# Patient Record
Sex: FEMALE | Race: BLACK OR AFRICAN AMERICAN | NOT HISPANIC OR LATINO | Employment: FULL TIME | URBAN - METROPOLITAN AREA
[De-identification: names, ages, dates, MRNs, and addresses within clinical notes are randomized per-mention and may not be internally consistent; named-entity substitution may affect disease eponyms.]

---

## 2021-08-04 ENCOUNTER — OFFICE VISIT (OUTPATIENT)
Dept: FAMILY MEDICINE | Facility: CLINIC | Age: 41
End: 2021-08-04
Payer: COMMERCIAL

## 2021-08-04 VITALS
OXYGEN SATURATION: 98 % | TEMPERATURE: 98 F | HEIGHT: 63 IN | BODY MASS INDEX: 51.91 KG/M2 | HEART RATE: 92 BPM | SYSTOLIC BLOOD PRESSURE: 140 MMHG | DIASTOLIC BLOOD PRESSURE: 90 MMHG | WEIGHT: 293 LBS

## 2021-08-04 DIAGNOSIS — Z12.31 SCREENING MAMMOGRAM, ENCOUNTER FOR: ICD-10-CM

## 2021-08-04 DIAGNOSIS — G89.29 CHRONIC PAIN OF LEFT KNEE: ICD-10-CM

## 2021-08-04 DIAGNOSIS — M25.562 CHRONIC PAIN OF LEFT KNEE: ICD-10-CM

## 2021-08-04 DIAGNOSIS — Z79.1 NSAID LONG-TERM USE: ICD-10-CM

## 2021-08-04 DIAGNOSIS — Z80.0 FAMILY HISTORY OF COLON CANCER IN MOTHER: ICD-10-CM

## 2021-08-04 DIAGNOSIS — Z13.220 ENCOUNTER FOR LIPID SCREENING FOR CARDIOVASCULAR DISEASE: ICD-10-CM

## 2021-08-04 DIAGNOSIS — I10 ESSENTIAL HYPERTENSION: Primary | ICD-10-CM

## 2021-08-04 DIAGNOSIS — Z13.6 ENCOUNTER FOR LIPID SCREENING FOR CARDIOVASCULAR DISEASE: ICD-10-CM

## 2021-08-04 PROBLEM — E66.9 OBESITY: Status: ACTIVE | Noted: 2017-04-21

## 2021-08-04 PROCEDURE — 3008F PR BODY MASS INDEX (BMI) DOCUMENTED: ICD-10-PCS | Mod: S$GLB,,, | Performed by: FAMILY MEDICINE

## 2021-08-04 PROCEDURE — 1160F PR REVIEW ALL MEDS BY PRESCRIBER/CLIN PHARMACIST DOCUMENTED: ICD-10-PCS | Mod: S$GLB,,, | Performed by: FAMILY MEDICINE

## 2021-08-04 PROCEDURE — 1159F MED LIST DOCD IN RCRD: CPT | Mod: S$GLB,,, | Performed by: FAMILY MEDICINE

## 2021-08-04 PROCEDURE — 3080F DIAST BP >= 90 MM HG: CPT | Mod: S$GLB,,, | Performed by: FAMILY MEDICINE

## 2021-08-04 PROCEDURE — 3077F PR MOST RECENT SYSTOLIC BLOOD PRESSURE >= 140 MM HG: ICD-10-PCS | Mod: S$GLB,,, | Performed by: FAMILY MEDICINE

## 2021-08-04 PROCEDURE — 3080F PR MOST RECENT DIASTOLIC BLOOD PRESSURE >= 90 MM HG: ICD-10-PCS | Mod: S$GLB,,, | Performed by: FAMILY MEDICINE

## 2021-08-04 PROCEDURE — 1159F PR MEDICATION LIST DOCUMENTED IN MEDICAL RECORD: ICD-10-PCS | Mod: S$GLB,,, | Performed by: FAMILY MEDICINE

## 2021-08-04 PROCEDURE — 3077F SYST BP >= 140 MM HG: CPT | Mod: S$GLB,,, | Performed by: FAMILY MEDICINE

## 2021-08-04 PROCEDURE — 99204 PR OFFICE/OUTPT VISIT, NEW, LEVL IV, 45-59 MIN: ICD-10-PCS | Mod: S$GLB,,, | Performed by: FAMILY MEDICINE

## 2021-08-04 PROCEDURE — 1126F PR PAIN SEVERITY QUANTIFIED, NO PAIN PRESENT: ICD-10-PCS | Mod: S$GLB,,, | Performed by: FAMILY MEDICINE

## 2021-08-04 PROCEDURE — 99204 OFFICE O/P NEW MOD 45 MIN: CPT | Mod: S$GLB,,, | Performed by: FAMILY MEDICINE

## 2021-08-04 PROCEDURE — 1126F AMNT PAIN NOTED NONE PRSNT: CPT | Mod: S$GLB,,, | Performed by: FAMILY MEDICINE

## 2021-08-04 PROCEDURE — 3008F BODY MASS INDEX DOCD: CPT | Mod: S$GLB,,, | Performed by: FAMILY MEDICINE

## 2021-08-04 PROCEDURE — 1160F RVW MEDS BY RX/DR IN RCRD: CPT | Mod: S$GLB,,, | Performed by: FAMILY MEDICINE

## 2021-08-04 RX ORDER — AMLODIPINE BESYLATE 10 MG/1
10 TABLET ORAL DAILY
Qty: 90 TABLET | Refills: 3 | Status: SHIPPED | OUTPATIENT
Start: 2021-08-04 | End: 2022-09-15 | Stop reason: SDUPTHER

## 2021-08-04 RX ORDER — IBUPROFEN 800 MG/1
800 TABLET ORAL 3 TIMES DAILY
COMMUNITY
Start: 2021-07-28 | End: 2021-08-04 | Stop reason: SDUPTHER

## 2021-08-04 RX ORDER — AMLODIPINE BESYLATE 10 MG/1
TABLET ORAL
COMMUNITY
End: 2021-08-04 | Stop reason: SDUPTHER

## 2021-08-04 RX ORDER — IBUPROFEN 800 MG/1
800 TABLET ORAL 3 TIMES DAILY PRN
Qty: 90 TABLET | Refills: 2 | Status: SHIPPED | OUTPATIENT
Start: 2021-08-04 | End: 2022-09-15 | Stop reason: SDUPTHER

## 2021-08-11 ENCOUNTER — CLINICAL SUPPORT (OUTPATIENT)
Dept: FAMILY MEDICINE | Facility: CLINIC | Age: 41
End: 2021-08-11
Payer: COMMERCIAL

## 2021-08-11 ENCOUNTER — LAB VISIT (OUTPATIENT)
Dept: LAB | Facility: CLINIC | Age: 41
End: 2021-08-11
Payer: COMMERCIAL

## 2021-08-11 VITALS — SYSTOLIC BLOOD PRESSURE: 130 MMHG | OXYGEN SATURATION: 98 % | HEART RATE: 85 BPM | DIASTOLIC BLOOD PRESSURE: 82 MMHG

## 2021-08-11 DIAGNOSIS — Z79.1 NSAID LONG-TERM USE: ICD-10-CM

## 2021-08-11 DIAGNOSIS — Z13.220 ENCOUNTER FOR LIPID SCREENING FOR CARDIOVASCULAR DISEASE: ICD-10-CM

## 2021-08-11 DIAGNOSIS — Z13.6 ENCOUNTER FOR LIPID SCREENING FOR CARDIOVASCULAR DISEASE: ICD-10-CM

## 2021-08-11 DIAGNOSIS — I10 ESSENTIAL HYPERTENSION: Primary | ICD-10-CM

## 2021-08-11 DIAGNOSIS — I10 ESSENTIAL HYPERTENSION: ICD-10-CM

## 2021-08-11 LAB
ALBUMIN SERPL BCP-MCNC: 3.3 G/DL (ref 3.5–5.2)
ALP SERPL-CCNC: 80 U/L (ref 55–135)
ALT SERPL W/O P-5'-P-CCNC: 9 U/L (ref 10–44)
ANION GAP SERPL CALC-SCNC: 10 MMOL/L (ref 8–16)
AST SERPL-CCNC: 14 U/L (ref 10–40)
BILIRUB SERPL-MCNC: 0.4 MG/DL (ref 0.1–1)
BUN SERPL-MCNC: 10 MG/DL (ref 6–20)
CALCIUM SERPL-MCNC: 8.9 MG/DL (ref 8.7–10.5)
CHLORIDE SERPL-SCNC: 105 MMOL/L (ref 95–110)
CHOLEST SERPL-MCNC: 131 MG/DL (ref 120–199)
CHOLEST/HDLC SERPL: 2.8 {RATIO} (ref 2–5)
CO2 SERPL-SCNC: 24 MMOL/L (ref 23–29)
CREAT SERPL-MCNC: 0.8 MG/DL (ref 0.5–1.4)
EST. GFR  (AFRICAN AMERICAN): >60 ML/MIN/1.73 M^2
EST. GFR  (NON AFRICAN AMERICAN): >60 ML/MIN/1.73 M^2
GLUCOSE SERPL-MCNC: 94 MG/DL (ref 70–110)
HDLC SERPL-MCNC: 46 MG/DL (ref 40–75)
HDLC SERPL: 35.1 % (ref 20–50)
LDLC SERPL CALC-MCNC: 72 MG/DL (ref 63–159)
NONHDLC SERPL-MCNC: 85 MG/DL
POTASSIUM SERPL-SCNC: 3.8 MMOL/L (ref 3.5–5.1)
PROT SERPL-MCNC: 7.4 G/DL (ref 6–8.4)
SODIUM SERPL-SCNC: 139 MMOL/L (ref 136–145)
TRIGL SERPL-MCNC: 65 MG/DL (ref 30–150)

## 2021-08-11 PROCEDURE — 80061 LIPID PANEL: CPT | Performed by: FAMILY MEDICINE

## 2021-08-11 PROCEDURE — 36415 COLL VENOUS BLD VENIPUNCTURE: CPT | Mod: ,,, | Performed by: FAMILY MEDICINE

## 2021-08-11 PROCEDURE — 80053 COMPREHEN METABOLIC PANEL: CPT | Performed by: FAMILY MEDICINE

## 2021-08-11 PROCEDURE — 36415 PR COLLECTION VENOUS BLOOD,VENIPUNCTURE: ICD-10-PCS | Mod: ,,, | Performed by: FAMILY MEDICINE

## 2022-01-05 ENCOUNTER — TELEPHONE (OUTPATIENT)
Dept: FAMILY MEDICINE | Facility: CLINIC | Age: 42
End: 2022-01-05
Payer: COMMERCIAL

## 2022-01-05 NOTE — TELEPHONE ENCOUNTER
----- Message from Vlad Calderon sent at 1/5/2022  4:12 PM CST -----  Type:  Sooner Apoointment Request    Caller is requesting a sooner appointment.  Caller declined first available appointment listed below.  Caller will not accept being placed on the waitlist and is requesting a message be sent to doctor.    Name of Caller:  pt  When is the first available appointment?  april  Symptoms:  sore throat/ loss voice   Best Call Back Number:  [phone]      Additional Information:  Please advise -- Thank you

## 2022-01-05 NOTE — TELEPHONE ENCOUNTER
Pt advised to report to [facility] urgent care. Pt verbalized understanding.

## 2022-01-06 ENCOUNTER — OFFICE VISIT (OUTPATIENT)
Dept: URGENT CARE | Facility: CLINIC | Age: 42
End: 2022-01-06
Payer: COMMERCIAL

## 2022-01-06 VITALS
SYSTOLIC BLOOD PRESSURE: 157 MMHG | TEMPERATURE: 98 F | DIASTOLIC BLOOD PRESSURE: 76 MMHG | OXYGEN SATURATION: 97 % | HEIGHT: 63 IN | WEIGHT: 293 LBS | RESPIRATION RATE: 20 BRPM | HEART RATE: 106 BPM | BODY MASS INDEX: 51.91 KG/M2

## 2022-01-06 DIAGNOSIS — U07.1 COVID-19: ICD-10-CM

## 2022-01-06 DIAGNOSIS — J02.9 SORE THROAT: Primary | ICD-10-CM

## 2022-01-06 LAB
CTP QC/QA: YES
CTP QC/QA: YES
S PYO RRNA THROAT QL PROBE: NEGATIVE
SARS-COV-2 AG RESP QL IA.RAPID: POSITIVE

## 2022-01-06 PROCEDURE — 99204 PR OFFICE/OUTPT VISIT, NEW, LEVL IV, 45-59 MIN: ICD-10-PCS | Mod: S$GLB,,, | Performed by: STUDENT IN AN ORGANIZED HEALTH CARE EDUCATION/TRAINING PROGRAM

## 2022-01-06 PROCEDURE — 87880 POCT RAPID STREP A: ICD-10-PCS | Mod: QW,,, | Performed by: STUDENT IN AN ORGANIZED HEALTH CARE EDUCATION/TRAINING PROGRAM

## 2022-01-06 PROCEDURE — 1159F PR MEDICATION LIST DOCUMENTED IN MEDICAL RECORD: ICD-10-PCS | Mod: S$GLB,,, | Performed by: STUDENT IN AN ORGANIZED HEALTH CARE EDUCATION/TRAINING PROGRAM

## 2022-01-06 PROCEDURE — 1160F RVW MEDS BY RX/DR IN RCRD: CPT | Mod: S$GLB,,, | Performed by: STUDENT IN AN ORGANIZED HEALTH CARE EDUCATION/TRAINING PROGRAM

## 2022-01-06 PROCEDURE — 3008F PR BODY MASS INDEX (BMI) DOCUMENTED: ICD-10-PCS | Mod: S$GLB,,, | Performed by: STUDENT IN AN ORGANIZED HEALTH CARE EDUCATION/TRAINING PROGRAM

## 2022-01-06 PROCEDURE — 3077F PR MOST RECENT SYSTOLIC BLOOD PRESSURE >= 140 MM HG: ICD-10-PCS | Mod: S$GLB,,, | Performed by: STUDENT IN AN ORGANIZED HEALTH CARE EDUCATION/TRAINING PROGRAM

## 2022-01-06 PROCEDURE — 87880 STREP A ASSAY W/OPTIC: CPT | Mod: QW,,, | Performed by: STUDENT IN AN ORGANIZED HEALTH CARE EDUCATION/TRAINING PROGRAM

## 2022-01-06 PROCEDURE — 1160F PR REVIEW ALL MEDS BY PRESCRIBER/CLIN PHARMACIST DOCUMENTED: ICD-10-PCS | Mod: S$GLB,,, | Performed by: STUDENT IN AN ORGANIZED HEALTH CARE EDUCATION/TRAINING PROGRAM

## 2022-01-06 PROCEDURE — 99204 OFFICE O/P NEW MOD 45 MIN: CPT | Mod: S$GLB,,, | Performed by: STUDENT IN AN ORGANIZED HEALTH CARE EDUCATION/TRAINING PROGRAM

## 2022-01-06 PROCEDURE — 1159F MED LIST DOCD IN RCRD: CPT | Mod: S$GLB,,, | Performed by: STUDENT IN AN ORGANIZED HEALTH CARE EDUCATION/TRAINING PROGRAM

## 2022-01-06 PROCEDURE — 87811 SARS-COV-2 COVID19 W/OPTIC: CPT | Mod: S$GLB,,, | Performed by: STUDENT IN AN ORGANIZED HEALTH CARE EDUCATION/TRAINING PROGRAM

## 2022-01-06 PROCEDURE — 3078F DIAST BP <80 MM HG: CPT | Mod: S$GLB,,, | Performed by: STUDENT IN AN ORGANIZED HEALTH CARE EDUCATION/TRAINING PROGRAM

## 2022-01-06 PROCEDURE — 3077F SYST BP >= 140 MM HG: CPT | Mod: S$GLB,,, | Performed by: STUDENT IN AN ORGANIZED HEALTH CARE EDUCATION/TRAINING PROGRAM

## 2022-01-06 PROCEDURE — 3008F BODY MASS INDEX DOCD: CPT | Mod: S$GLB,,, | Performed by: STUDENT IN AN ORGANIZED HEALTH CARE EDUCATION/TRAINING PROGRAM

## 2022-01-06 PROCEDURE — 87811 SARS CORONAVIRUS 2 ANTIGEN POCT, MANUAL READ: ICD-10-PCS | Mod: S$GLB,,, | Performed by: STUDENT IN AN ORGANIZED HEALTH CARE EDUCATION/TRAINING PROGRAM

## 2022-01-06 PROCEDURE — 3078F PR MOST RECENT DIASTOLIC BLOOD PRESSURE < 80 MM HG: ICD-10-PCS | Mod: S$GLB,,, | Performed by: STUDENT IN AN ORGANIZED HEALTH CARE EDUCATION/TRAINING PROGRAM

## 2022-01-06 RX ORDER — DEXTROMETHORPHAN HYDROBROMIDE, GUAIFENESIN, PHENYLEPHRINE HYDROCHLORIDE 17.5; 400; 1 MG/1; MG/1; MG/1
1 TABLET ORAL
Qty: 42 TABLET | Refills: 0 | Status: SHIPPED | OUTPATIENT
Start: 2022-01-06

## 2022-01-06 RX ORDER — FLUTICASONE PROPIONATE 50 MCG
1 SPRAY, SUSPENSION (ML) NASAL DAILY
Qty: 15.8 ML | Refills: 0 | Status: SHIPPED | OUTPATIENT
Start: 2022-01-06

## 2022-01-06 NOTE — PATIENT INSTRUCTIONS
Patient Education       COVID-19 Discharge Instructions   About this topic   Coronavirus disease 2019 is also known as COVID-19. It is a viral illness that infects the lungs. It is caused by a virus called SARS-associated coronavirus (SARS-CoV-2).  The signs of COVID-19 most often start a few days after you have been infected. In some people, it takes longer to show signs. Others never show signs of the infection. You may have a cough, fever, shaking chills and it may be hard to breathe. You may be very tired, have muscle aches, a headache or sore throat. Some people have an upset stomach or loose stools. Others lose their sense of smell or taste. You may not have these signs all the time and they may come and go while you are sick.  The virus spreads easily through droplets when you talk, sneeze, or cough. You can pass the virus to others when you are talking close together, singing, hugging, sharing food, or shaking hands. Doctors believe the germs also survive on surfaces like tables, door handles, and telephones. However, this is not a common way that COVID-19 spreads. Doctors believe you can also spread the infection even if you dont have any symptoms, but they do not know how that happens. This is why getting vaccinated is one of the best ways to keep you healthy and slow the spread of the virus.  Some people have a mild case of COVID-19 and are able to stay at home and away from others until they feel better. Others may need to be in the hospital if they are very sick. Some people with COVID-19 can have some symptoms for weeks or months. People with COVID-19 must isolate themselves. You can start to be around others when your doctor says it is safe to do so.       What care is needed at home?   · Ask your doctor what you need to do when you go home. Make sure you ask questions if you do not understand what the doctor says.  · Drink lots of water, juice, or broth to replace fluids lost from a fever.  · You  may use cool mist humidifiers to help ease congestion and coughing.  · Use 2 to 3 pillows to prop yourself up when you lie down to make it easier to breathe and sleep.  · Do not smoke and do not drink beer, wine, and mixed drinks (alcohol).  · To lower the chance of passing the infection to others, get a COVID-19 vaccine after your infection has resolved.  · If you have not been fully vaccinated:  ? Wear a mask over your mouth and nose if you are around others who are not sick. Cloth masks work best if they have more than one layer of fabric.  ? Wash your hands often.  ? Stay home in a separate room, if possible, away from others. Only go out to get medical care.  ? Use a separate bathroom if possible.  ? Do not make food for others.  What follow-up care is needed?   · Your doctor may ask you to make visits to the office to check on your progress. Be sure to keep these visits. Make sure you wear a mask at these visits.  · If you can, tell the staff you have COVID-19 ahead of time so they can take extra care to stop the disease from spreading.  · It may take a few weeks before your health returns to normal.  What drugs may be needed?   The doctor may order drugs to:  · Help with breathing  · Help with fever  · Help with swelling in your airways and lungs  · Control coughing  · Ease a sore throat  · Help a runny or stuffy nose  Will physical activity be limited?   You may have to limit your physical activity. Talk to your doctor about the right amount of activity for you. If you have been very sick with COVID-19, it can take some time to get your strength back.  Will there be any other care needed?   Doctors do not know how long you can pass the virus on to others after you are sick. This is why it is important to stay in a separate room, if possible, when you are sick. For now, doctors are giving general guidelines for you to follow after you have been sick. Before you go around other people, you should:  · Be fever  free for 24 hours without taking any drugs to lower the fever  · Have no symptoms of cough or shortness of breath  · Wait at least 10 days after first having symptoms or your first positive test, and you need to be symptom free as above. Some experts suggest waiting 20 days if you have had a more severe infection.  Talk with your doctor about getting a COVID-19 vaccine.  What problems could happen?   · Fluid loss. This is dehydration.  · Short-term or long-term lung damage  · Heart problems  · Death  When do I need to call the doctor?   · You are having so much trouble breathing that you can only say one or two words at a time.  · You need to sit upright at all times to be able to breathe and/or cannot lie down.  · You are very confused or cannot stay awake.  · Your lips or skin start to turn blue or grey.  · You think you might be having a medical emergency. Some examples of medical emergencies are:  ? Severe chest pain.  ? Not able to speak or move normally.  · You have trouble breathing when talking or sitting still.  · You have new shortness of breath.  · You become weak or dizzy.  · You have very dark urine or do not pass urine for more than 8 hours.  · You have new or worsening COVID-19 symptoms like:  ? Fever  ? Cough  ? Feeling very tired  ? Shaking chills  ? Headache  ? Trouble swallowing  ? Throwing up  ? Loose stools  ? Reddish purple spots on your fingers or toes  Teach Back: Helping You Understand   The Teach Back Method helps you understand the information we are giving you. After you talk with the staff, tell them in your own words what you learned. This helps to make sure the staff has described each thing clearly. It also helps to explain things that may have been confusing. Before going home, make sure you can do these:  · I can tell you about my condition.  · I can tell you what may help ease my breathing.  · I can tell you what I can do to help avoid passing the infection to others.  · I can tell  you what I will do if I have trouble breathing; feel sleepy or confused; or my fingertips, fingernails, skin, or lips are blue.  Where can I learn more?   Centers for Disease Control and Prevention  https://www.cdc.gov/coronavirus/2019-ncov/about/index.html   Centers for Disease Control and Prevention  https://www.cdc.gov/coronavirus/2019-ncov/hcp/disposition-in-home-patients.html   World Health Organization  https://www.who.int/news-room/q-a-detail/d-h-tfzxyfgfbxqtj   Last Reviewed Date   2021-10-05  Consumer Information Use and Disclaimer   This information is not specific medical advice and does not replace information you receive from your health care provider. This is only a brief summary of general information. It does NOT include all information about conditions, illnesses, injuries, tests, procedures, treatments, therapies, discharge instructions or life-style choices that may apply to you. You must talk with your health care provider for complete information about your health and treatment options. This information should not be used to decide whether or not to accept your health care providers advice, instructions or recommendations. Only your health care provider has the knowledge and training to provide advice that is right for you.  Copyright   Copyright © 2021 [employer] and its affiliates and/or licensors. All rights reserved.

## 2022-01-06 NOTE — PROGRESS NOTES
"Subjective:       Patient ID: Yadira Thornton is a 41 y.o. female.    Vitals:  height is 5' 3" (1.6 m) and weight is 185.1 kg (408 lb) (abnormal). Her temperature is 97.8 °F (36.6 °C). Her blood pressure is 157/76 (abnormal) and her pulse is 106. Her respiration is 20 and oxygen saturation is 97%.     Chief Complaint: Sinus Problem    Patient is a 41-year-old female with history of hypertension who presents to clinic for evaluation of sinus issues.  Patient reports symptoms times 3-4 days.  Patient denies any recent or known sick exposure.  Patient denies any over-the-counter medications for symptoms at this point.  Patient complaining of nasal sinus congestion with clear mucus, postnasal drainage, sore throat with voice change however no painful swallowing, and ear fullness.  Patient denies any acute loss of smell or taste, headaches or dizziness, fever or chills, generalized body aches, chest pain or palpitations, shortness of breath, cough, abdominal pain, nausea or vomiting, or diarrhea.  Patient reports it is not vaccinated for COVID at this time.    Sinus Problem  This is a new problem. The current episode started in the past 7 days. The problem has been gradually worsening since onset. There has been no fever. Associated symptoms include congestion (Clear mucus), ear pain (Ear fullness), a hoarse voice and a sore throat. Pertinent negatives include no chills, coughing, diaphoresis, headaches or shortness of breath.       Constitution: Negative for chills, sweating, fatigue and fever.   HENT: Positive for ear pain (Ear fullness), congestion (Clear mucus), postnasal drip, sore throat and voice change. Negative for trouble swallowing.    Neck: neck negative.   Cardiovascular: Negative.  Negative for chest pain and palpitations.   Eyes: Negative.    Respiratory: Negative for chest tightness, cough and shortness of breath.    Gastrointestinal: Negative.  Negative for abdominal pain, nausea, vomiting and diarrhea. "   Endocrine: negative.   Genitourinary: Negative.    Musculoskeletal: Negative.  Negative for muscle ache.   Skin: Negative.  Negative for color change, pale, rash and erythema.   Allergic/Immunologic: Negative.    Neurological: Negative.  Negative for dizziness, passing out, facial drooping, headaches, disorientation and altered mental status.   Hematologic/Lymphatic: Negative.    Psychiatric/Behavioral: Negative.  Negative for altered mental status, disorientation and confusion.       Objective:      Physical Exam   Constitutional: She is oriented to person, place, and time. She appears well-developed and well-nourished. She is cooperative.  Non-toxic appearance. She does not appear ill. No distress. obesity  HENT:   Head: Normocephalic and atraumatic.   Ears:   Right Ear: Hearing, tympanic membrane, external ear and ear canal normal.   Left Ear: Hearing, tympanic membrane, external ear and ear canal normal.   Nose: Congestion present. No mucosal edema, rhinorrhea or nasal deformity. No epistaxis. Right sinus exhibits no maxillary sinus tenderness and no frontal sinus tenderness. Left sinus exhibits no maxillary sinus tenderness and no frontal sinus tenderness.   Mouth/Throat: Uvula is midline and mucous membranes are normal. Mucous membranes are moist. No trismus in the jaw. Normal dentition. No uvula swelling. Posterior oropharyngeal erythema present. No oropharyngeal exudate. Tonsils are 1+ on the right. Oropharynx is clear.   Eyes: Conjunctivae and lids are normal. Pupils are equal, round, and reactive to light. Right eye exhibits no discharge. Left eye exhibits no discharge. No scleral icterus.   Neck: Trachea normal and phonation normal. Neck supple. No neck rigidity present.   Cardiovascular: Regular rhythm, normal heart sounds, intact distal pulses and normal pulses. Tachycardia present.   Pulmonary/Chest: Effort normal and breath sounds normal. No respiratory distress (Able to speak in full complete  sentences.  Equal rise and fall of chest.). She has no wheezes. She has no rhonchi. She has no rales.   Abdominal: Normal appearance and bowel sounds are normal. She exhibits no distension. Soft. There is no abdominal tenderness.   Musculoskeletal: Normal range of motion.         General: No deformity or edema. Normal range of motion.      Cervical back: She exhibits no tenderness.   Lymphadenopathy:     She has no cervical adenopathy.   Neurological: She is alert and oriented to person, place, and time. She exhibits normal muscle tone. Coordination normal.   Skin: Skin is warm, dry, intact, not diaphoretic, not pale and no rash. Capillary refill takes 2 to 3 seconds. No erythema   Psychiatric: She has a normal mood and affect. Her speech is normal and behavior is normal. Judgment and thought content normal.   Nursing note and vitals reviewed.        Assessment:       1. Sore throat    2. COVID-19          Plan:         Sore throat  -     SARS Coronavirus 2 Antigen, POCT Manual Read  -     POCT rapid strep A    COVID-19    Other orders  -     phenylephrine-DM-guaiFENesin (DECONEX DMX) 10-17.5-400 mg Tab; Take 1 tablet by mouth every 4 to 6 hours as needed (Congestion).  Dispense: 42 tablet; Refill: 0  -     fluticasone propionate (FLONASE) 50 mcg/actuation nasal spray; 1 spray (50 mcg total) by Each Nostril route once daily.  Dispense: 15.8 mL; Refill: 0                 Labs:  Rapid strep negative.  COVID positive.  Quarantine as recommended.  COVID recommendations provided.  Take medications as prescribed.  Patient refused need for monoclonal antibody infusion.  Follow-up with PCP in 1 day.  Return to clinic as needed.  To ED for any new or acutely worsening symptoms.  Patient in agreement with plan of care.

## 2022-01-18 ENCOUNTER — TELEPHONE (OUTPATIENT)
Dept: FAMILY MEDICINE | Facility: CLINIC | Age: 42
End: 2022-01-18
Payer: COMMERCIAL

## 2022-06-14 ENCOUNTER — TELEPHONE (OUTPATIENT)
Dept: FAMILY MEDICINE | Facility: CLINIC | Age: 42
End: 2022-06-14
Payer: COMMERCIAL

## 2022-08-24 DIAGNOSIS — I10 ESSENTIAL HYPERTENSION: ICD-10-CM

## 2022-08-31 DIAGNOSIS — Z11.59 NEED FOR HEPATITIS C SCREENING TEST: ICD-10-CM

## 2022-09-15 ENCOUNTER — OFFICE VISIT (OUTPATIENT)
Dept: FAMILY MEDICINE | Facility: CLINIC | Age: 42
End: 2022-09-15
Payer: COMMERCIAL

## 2022-09-15 VITALS
WEIGHT: 293 LBS | HEART RATE: 96 BPM | OXYGEN SATURATION: 99 % | BODY MASS INDEX: 51.91 KG/M2 | DIASTOLIC BLOOD PRESSURE: 84 MMHG | SYSTOLIC BLOOD PRESSURE: 134 MMHG | RESPIRATION RATE: 18 BRPM | HEIGHT: 63 IN

## 2022-09-15 DIAGNOSIS — G89.29 CHRONIC PAIN OF LEFT KNEE: ICD-10-CM

## 2022-09-15 DIAGNOSIS — R20.0 LEFT LEG NUMBNESS: ICD-10-CM

## 2022-09-15 DIAGNOSIS — M25.562 CHRONIC PAIN OF LEFT KNEE: ICD-10-CM

## 2022-09-15 DIAGNOSIS — I10 ESSENTIAL HYPERTENSION: ICD-10-CM

## 2022-09-15 PROCEDURE — 3079F DIAST BP 80-89 MM HG: CPT | Mod: S$GLB,,, | Performed by: FAMILY MEDICINE

## 2022-09-15 PROCEDURE — 1160F PR REVIEW ALL MEDS BY PRESCRIBER/CLIN PHARMACIST DOCUMENTED: ICD-10-PCS | Mod: S$GLB,,, | Performed by: FAMILY MEDICINE

## 2022-09-15 PROCEDURE — 1159F PR MEDICATION LIST DOCUMENTED IN MEDICAL RECORD: ICD-10-PCS | Mod: S$GLB,,, | Performed by: FAMILY MEDICINE

## 2022-09-15 PROCEDURE — 99214 PR OFFICE/OUTPT VISIT, EST, LEVL IV, 30-39 MIN: ICD-10-PCS | Mod: S$GLB,,, | Performed by: FAMILY MEDICINE

## 2022-09-15 PROCEDURE — 3075F SYST BP GE 130 - 139MM HG: CPT | Mod: S$GLB,,, | Performed by: FAMILY MEDICINE

## 2022-09-15 PROCEDURE — 1159F MED LIST DOCD IN RCRD: CPT | Mod: S$GLB,,, | Performed by: FAMILY MEDICINE

## 2022-09-15 PROCEDURE — 3008F PR BODY MASS INDEX (BMI) DOCUMENTED: ICD-10-PCS | Mod: S$GLB,,, | Performed by: FAMILY MEDICINE

## 2022-09-15 PROCEDURE — 99999 PR PBB SHADOW E&M-EST. PATIENT-LVL IV: ICD-10-PCS | Mod: PBBFAC,,, | Performed by: FAMILY MEDICINE

## 2022-09-15 PROCEDURE — 3075F PR MOST RECENT SYSTOLIC BLOOD PRESS GE 130-139MM HG: ICD-10-PCS | Mod: S$GLB,,, | Performed by: FAMILY MEDICINE

## 2022-09-15 PROCEDURE — 3008F BODY MASS INDEX DOCD: CPT | Mod: S$GLB,,, | Performed by: FAMILY MEDICINE

## 2022-09-15 PROCEDURE — 99999 PR PBB SHADOW E&M-EST. PATIENT-LVL IV: CPT | Mod: PBBFAC,,, | Performed by: FAMILY MEDICINE

## 2022-09-15 PROCEDURE — 99214 OFFICE O/P EST MOD 30 MIN: CPT | Mod: S$GLB,,, | Performed by: FAMILY MEDICINE

## 2022-09-15 PROCEDURE — 3079F PR MOST RECENT DIASTOLIC BLOOD PRESSURE 80-89 MM HG: ICD-10-PCS | Mod: S$GLB,,, | Performed by: FAMILY MEDICINE

## 2022-09-15 PROCEDURE — 1160F RVW MEDS BY RX/DR IN RCRD: CPT | Mod: S$GLB,,, | Performed by: FAMILY MEDICINE

## 2022-09-15 RX ORDER — AMLODIPINE BESYLATE 10 MG/1
10 TABLET ORAL DAILY
Qty: 90 TABLET | Refills: 3 | Status: SHIPPED | OUTPATIENT
Start: 2022-09-15

## 2022-09-15 RX ORDER — IBUPROFEN 800 MG/1
800 TABLET ORAL 3 TIMES DAILY PRN
Qty: 90 TABLET | Refills: 3 | Status: SHIPPED | OUTPATIENT
Start: 2022-09-15

## 2022-09-15 NOTE — PROGRESS NOTES
Subjective:       Patient ID: Yadira Thornton is a 42 y.o. female.    Chief Complaint: Medication Refill and leg numbness    This patient is new to me.  Yadira Thornton presents to the clinic today for medication refills and complaints of leg numbness. Patient states the leg numbness comes and goes. Patient states she will be standing at work and it'll start. Patient states it feels like it makes her leg stick to her clothes and then it resolves.   Patient educated on plan of care, verbalized understanding.       Review of Systems   Constitutional:  Negative for activity change, appetite change, chills, diaphoresis and fever.   HENT:  Negative for congestion, ear pain, postnasal drip, sinus pressure, sneezing and sore throat.    Eyes:  Negative for pain, discharge, redness and itching.   Respiratory:  Negative for apnea, cough, chest tightness, shortness of breath and wheezing.    Cardiovascular:  Negative for chest pain and leg swelling.   Gastrointestinal:  Negative for abdominal distention, abdominal pain, constipation, diarrhea, nausea and vomiting.   Genitourinary:  Negative for difficulty urinating, dysuria, flank pain and frequency.   Musculoskeletal:  Positive for myalgias.   Skin:  Negative for color change, rash and wound.   Neurological:  Negative for dizziness.     Patient Active Problem List   Diagnosis    Trigeminal neuralgia    Essential hypertension    Obesity    Chronic pain of left knee       Objective:      Physical Exam  Vitals reviewed.   Constitutional:       General: She is not in acute distress.     Appearance: Normal appearance. She is well-developed. She is obese.   HENT:      Head: Normocephalic.      Nose: Nose normal.   Eyes:      Conjunctiva/sclera: Conjunctivae normal.      Pupils: Pupils are equal, round, and reactive to light.   Cardiovascular:      Rate and Rhythm: Normal rate and regular rhythm.      Heart sounds: Normal heart sounds.   Pulmonary:      Effort: Pulmonary effort is normal.  "No respiratory distress.      Breath sounds: Normal breath sounds.   Musculoskeletal:      Cervical back: Normal range of motion and neck supple.   Skin:     General: Skin is warm and dry.      Findings: No rash.   Neurological:      Mental Status: She is alert and oriented to person, place, and time.   Psychiatric:         Mood and Affect: Mood normal.         Behavior: Behavior normal.       Lab Results   Component Value Date    CHOL 131 08/11/2021    TRIG 65 08/11/2021    HDL 46 08/11/2021    ALT 9 (L) 08/11/2021    AST 14 08/11/2021     08/11/2021    K 3.8 08/11/2021     08/11/2021    CREATININE 0.8 08/11/2021    BUN 10 08/11/2021    CO2 24 08/11/2021     The 10-year ASCVD risk score (Meg CRAMER, et al., 2019) is: 2.5%    Values used to calculate the score:      Age: 42 years      Sex: Female      Is Non- : Yes      Diabetic: No      Tobacco smoker: No      Systolic Blood Pressure: 134 mmHg      Is BP treated: Yes      HDL Cholesterol: 46 mg/dL      Total Cholesterol: 131 mg/dL  Visit Vitals  /84 (BP Location: Right arm, Patient Position: Sitting, BP Method: Large (Manual))   Pulse 96   Resp 18   Ht 5' 3" (1.6 m)   Wt (!) 187.4 kg (413 lb 2.3 oz)   LMP 09/10/2022 (Exact Date)   SpO2 99%   BMI 73.18 kg/m²      Assessment:       1. Body mass index (BMI) 70 or greater, adult    2. Chronic pain of left knee    3. Essential hypertension    4. Left leg numbness        Plan:       Yadira was seen today for medication refill and leg numbness.    Diagnoses and all orders for this visit:    Body mass index (BMI) 70 or greater, adult  Body mass index is 73.18 kg/m².  Continue healthy diet and regular exercise as tolerated.  Continue medications as prescribed.  Follow up with PCP     Chronic pain of left knee  -     ibuprofen (ADVIL,MOTRIN) 800 MG tablet; Take 1 tablet (800 mg total) by mouth 3 (three) times daily as needed for Pain.  Stable    Essential hypertension  -     amLODIPine " (NORVASC) 10 MG tablet; Take 1 tablet (10 mg total) by mouth once daily.  Stable  Continue medications as prescribed.  Follow up with PCP     Left leg numbness  -     US Lower Extremity Veins Left; Future  Discussed stretches for pinched nerve  Discussed possible follow up with physical medicine and rehab  Continue medications as prescribed.  Follow up with PCP      Follow up if symptoms worsen or fail to improve.      Future Appointments       Date Specialty Appt Notes    9/19/2022 Lab     9/20/2022 Radiology Left leg numbness [R20.0]

## 2022-09-19 ENCOUNTER — LAB VISIT (OUTPATIENT)
Dept: LAB | Facility: CLINIC | Age: 42
End: 2022-09-19
Payer: COMMERCIAL

## 2022-09-19 ENCOUNTER — TELEPHONE (OUTPATIENT)
Dept: FAMILY MEDICINE | Facility: CLINIC | Age: 42
End: 2022-09-19
Payer: COMMERCIAL

## 2022-09-19 DIAGNOSIS — Z11.59 NEED FOR HEPATITIS C SCREENING TEST: ICD-10-CM

## 2022-09-19 DIAGNOSIS — I10 ESSENTIAL HYPERTENSION: ICD-10-CM

## 2022-09-19 LAB
ALBUMIN SERPL BCP-MCNC: 3.6 G/DL (ref 3.5–5.2)
ALP SERPL-CCNC: 83 U/L (ref 55–135)
ALT SERPL W/O P-5'-P-CCNC: 16 U/L (ref 10–44)
ANION GAP SERPL CALC-SCNC: 10 MMOL/L (ref 8–16)
AST SERPL-CCNC: 18 U/L (ref 10–40)
BILIRUB SERPL-MCNC: 0.3 MG/DL (ref 0.1–1)
BUN SERPL-MCNC: 14 MG/DL (ref 6–20)
CALCIUM SERPL-MCNC: 9 MG/DL (ref 8.7–10.5)
CHLORIDE SERPL-SCNC: 104 MMOL/L (ref 95–110)
CO2 SERPL-SCNC: 25 MMOL/L (ref 23–29)
CREAT SERPL-MCNC: 0.9 MG/DL (ref 0.5–1.4)
EST. GFR  (NO RACE VARIABLE): >60 ML/MIN/1.73 M^2
GLUCOSE SERPL-MCNC: 92 MG/DL (ref 70–110)
POTASSIUM SERPL-SCNC: 3.7 MMOL/L (ref 3.5–5.1)
PROT SERPL-MCNC: 7.7 G/DL (ref 6–8.4)
SODIUM SERPL-SCNC: 139 MMOL/L (ref 136–145)

## 2022-09-19 PROCEDURE — 80053 COMPREHEN METABOLIC PANEL: CPT | Performed by: FAMILY MEDICINE

## 2022-09-19 PROCEDURE — 86803 HEPATITIS C AB TEST: CPT | Performed by: FAMILY MEDICINE

## 2022-09-19 NOTE — TELEPHONE ENCOUNTER
----- Message from Rosa Solares MD sent at 9/19/2022  1:27 PM CDT -----  Great news. Labs have normalized and this is wonderful. Normal result

## 2022-09-20 ENCOUNTER — HOSPITAL ENCOUNTER (OUTPATIENT)
Dept: RADIOLOGY | Facility: HOSPITAL | Age: 42
Discharge: HOME | End: 2022-09-20
Attending: FAMILY MEDICINE
Payer: COMMERCIAL

## 2022-09-20 DIAGNOSIS — R20.0 LEFT LEG NUMBNESS: ICD-10-CM

## 2022-09-20 LAB — HCV AB SERPL QL IA: NORMAL

## 2022-09-20 PROCEDURE — 93971 EXTREMITY STUDY: CPT | Mod: 26,LT,, | Performed by: RADIOLOGY

## 2022-09-20 PROCEDURE — 93971 US LOWER EXTREMITY VEINS LEFT: ICD-10-PCS | Mod: 26,LT,, | Performed by: RADIOLOGY

## 2022-09-20 PROCEDURE — 93971 EXTREMITY STUDY: CPT | Mod: TC,LT

## 2023-03-21 ENCOUNTER — PATIENT OUTREACH (OUTPATIENT)
Dept: ADMINISTRATIVE | Facility: HOSPITAL | Age: 43
End: 2023-03-21
Payer: COMMERCIAL

## 2023-03-21 NOTE — LETTER
March 21, 2023    Yadira Thornton  [address]  [place] [address]             [facility]  [address] [address]  Phone: [phone] Dear Cora Hinton, [facility] is committed to your overall health.  To help you get the most out of each of your visits, we will review your information to make sure you are up to date on all of your recommended tests and/or procedures.      Mimi Prescott MD has found that your chart shows you may be due for a PAP SMEAR & MAMMOGRAM.     A pap smear should be completed at least every 3 years and a mammogram should be completed at least every 2 years.      If you have had any of the above done at another facility, please let us know so that we may obtain copies from that facility. Your [facility] chart can not be updated without the record.    Otherwise, please schedule these appointments at your earliest convenience by calling [phone] or going to [email or web address].    If you are no longer using [facility] for you Family Medicine needs please let us know so we can update the care team in [facility].    Thank you for letting [facility] take care of your healthcare needs.    Ca Salter LPN  Performance Improvement Coordinator  [facility]  [address] [place] [address] [phone]

## 2023-03-21 NOTE — PROGRESS NOTES
Population Health chart review. Working a non-compliant Cervical Cancer Screening report. Attempted to reach pt by phone, no answer, no VM, sending portal/letter outreach at this time. Also sending notification about PCP changes.

## 2023-03-21 NOTE — LETTER
March 21, 2023    Yadira Thornton  [address]  [place] [address]             [facility]  [address] [address]  Phone: [phone] Yadira Thornton     [facility] provider, Mimi Prescott MD is no longer practicing at [facility]. She has moved to our [facility] in [place], [place]. We are thankful for the excellent care she has provided to her patients and our community.     To make the transition to a new provider as seamless as possible, we encourage you to continue your care with [facility]. To ensure the continuity of your care, Mimi Prescott MD patients can be seamlessly transitioned to one of several Family Medicine providers within our group practice. If you would like to continue care with Mimi Prescott MD at [facility], please call [phone] to schedule appointment.     Listed below are providers we would like you to consider as you resume your care with [facility]. Information about the unique qualifications and special areas of interest held by our physicians can be found at [email or web address].                 Providers:                                          Location:                                                     Brittny Oconnor MD          [facility]     SCOTTY Koroma NP Karen McDay, NP                                                                                                          We are confident that you will continue to receive the excellent treatment and service to which you are accustomed. As always, your medical records are available electronically to any of our providers, ensuring no disruption in your care.  If you need an appointment, please call [phone] to schedule with the team.  You can also schedule appointments online at [email or web address].     We also have Family Medicine clincs in:     [place], [place]  [place], [place]  [place], [place]  [place]     We value the trust you have placed in [facility] in allowing us the privilege of caring for you and your familys medical needs.       Sincerely,     [facility]

## 2023-05-24 ENCOUNTER — HOSPITAL ENCOUNTER (EMERGENCY)
Facility: HOSPITAL | Age: 43
Discharge: HOME | End: 2023-05-24
Attending: EMERGENCY MEDICINE
Payer: COMMERCIAL

## 2023-05-24 VITALS
DIASTOLIC BLOOD PRESSURE: 78 MMHG | TEMPERATURE: 99 F | BODY MASS INDEX: 51.91 KG/M2 | SYSTOLIC BLOOD PRESSURE: 145 MMHG | HEIGHT: 63 IN | OXYGEN SATURATION: 100 % | HEART RATE: 88 BPM | WEIGHT: 293 LBS | RESPIRATION RATE: 17 BRPM

## 2023-05-24 DIAGNOSIS — G89.29 CHRONIC PAIN OF RIGHT KNEE: Primary | ICD-10-CM

## 2023-05-24 DIAGNOSIS — M25.561 CHRONIC PAIN OF RIGHT KNEE: Primary | ICD-10-CM

## 2023-05-24 DIAGNOSIS — R52 PAIN: ICD-10-CM

## 2023-05-24 PROCEDURE — 25000003 PHARM REV CODE 250: Performed by: NURSE PRACTITIONER

## 2023-05-24 PROCEDURE — 99283 EMERGENCY DEPT VISIT LOW MDM: CPT

## 2023-05-24 RX ORDER — OXYCODONE AND ACETAMINOPHEN 5; 325 MG/1; MG/1
1 TABLET ORAL EVERY 6 HOURS PRN
Qty: 12 TABLET | Refills: 0 | Status: SHIPPED | OUTPATIENT
Start: 2023-05-24

## 2023-05-24 RX ORDER — OXYCODONE AND ACETAMINOPHEN 5; 325 MG/1; MG/1
1 TABLET ORAL
Status: COMPLETED | OUTPATIENT
Start: 2023-05-24 | End: 2023-05-24

## 2023-05-24 RX ADMIN — OXYCODONE AND ACETAMINOPHEN 1 TABLET: 325; 5 TABLET ORAL at 10:05

## 2023-05-24 NOTE — Clinical Note
"Yadira Lazosmooth Thornton was seen and treated in our emergency department on 5/24/2023.  She may return to work on 05/29/2023.       If you have any questions or concerns, please don't hesitate to call.      Lupe Alcantara NP"

## 2023-05-24 NOTE — DISCHARGE INSTRUCTIONS
Make a follow-up appoint with Dr. Kumar or an orthopedist of your choice.  Return to the ED for any worsening of symptoms or any other concerns.  Take your pain medication as instructed.  You may not drive on this pain medication

## 2023-05-24 NOTE — ED PROVIDER NOTES
Encounter Date: 2023       History     Chief Complaint   Patient presents with    Leg Pain     Pt. States that she started to have right leg/knee pain three weeks ago that progressively gotten worse.      Presents with complaint of right knee pain.  Onset 2 weeks ago.  This is chronic pain.  Patient reports it is a gradual onset.  Patient has seen an Ortho in [place] in the past.  Denies taking any medication for the pain not even OTCs.  Patient has a job where she stands on feet for long hours.  Denies injury.  Patient is morbidly obese.    Review of patient's allergies indicates:   Allergen Reactions    Sulfa (sulfonamide antibiotics) Swelling    Carbamazepine Rash    Hydrochlorothiazide Rash    Lisinopril Rash     Past Medical History:   Diagnosis Date    Chronic knee pain     Hypertension     Obesity      Past Surgical History:   Procedure Laterality Date     SECTION      x 2     Family History   Problem Relation Age of Onset    Cancer Mother     Hypertension Maternal Grandmother     Diabetes Maternal Grandmother      Social History     Tobacco Use    Smoking status: Never    Smokeless tobacco: Never   Substance Use Topics    Alcohol use: Not Currently    Drug use: Never     Review of Systems   Constitutional:  Negative for fever.   Respiratory:  Negative for cough, shortness of breath and wheezing.    Cardiovascular:  Negative for chest pain, palpitations and leg swelling.   Gastrointestinal:  Negative for abdominal pain, diarrhea, nausea and vomiting.   Genitourinary:  Negative for dysuria.   Musculoskeletal:  Negative for back pain.        Right knee pain   Skin:  Negative for rash.   Neurological:  Negative for weakness.     Physical Exam     Initial Vitals [23 0956]   BP Pulse Resp Temp SpO2   (!) 186/78 97 20 98.8 °F (37.1 °C) 99 %      MAP       --         Physical Exam    Constitutional: She appears well-developed and well-nourished.   HENT:   Head: Normocephalic.   Mouth/Throat:  Oropharynx is clear and moist.   Eyes: Conjunctivae are normal.   Neck: Neck supple.   Normal range of motion.  Cardiovascular:  Normal rate, regular rhythm and normal heart sounds.           Pulmonary/Chest: Breath sounds normal. No respiratory distress.   Musculoskeletal:         General: Normal range of motion.      Cervical back: Normal range of motion and neck supple.      Comments: Patient is able to bend and extend her knee without difficulty.  She does report this increases her pain.  Patient is morbidly obese.  It is difficult to assess swelling.     Neurological: She is alert and oriented to person, place, and time. No sensory deficit. GCS score is 15. GCS eye subscore is 4. GCS verbal subscore is 5. GCS motor subscore is 6.   Skin: Skin is warm and dry.   Psychiatric: She has a normal mood and affect. Thought content normal.       ED Course   Procedures  Labs Reviewed - No data to display         Imaging Results              X-Ray Knee Complete 4 or more Views Right (Final result)  Result time 05/24/23 10:42:08   Procedure changed from X-Ray Knee 3 View Right     Final result by John Vasquez Jr., MD (05/24/23 10:42:08)                   Narrative:    HISTORY: pain    COMPARISON:None available.    FINDINGS:Osseous structures appear intact without evidence of an acute fracture deformity. Chronic degenerative arthrosis of the medial joint line narrowing of the medial trochanter cartilage space, sparing the lateral tibiofemoral and patellofemoral joint spaces. No significant joint effusion. Soft tissues are within normal limits.    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    Electronically signed by:  John Vasquez MD  5/24/2023 10:42 AM CDT Workstation: [id number]                                     Medications   oxyCODONE-acetaminophen 5-325 mg per tablet 1 tablet (1 tablet Oral  Given 5/24/23 1022)     Medical Decision Making:   Initial Assessment:   Presents with right knee pain.  Onset 2 weeks.  Patient reports gradual onset worsening.  Denies injury  Clinical Tests:   Radiological Study: Reviewed  ED Management:  Patient was given Percocet 5 mg for the pain with good results.  She has a  at bedside.  Her x-ray reports chronic degenerative arthrosis.  As stated before patient is morbidly obese.  We have had a discussion about the possibility of her having a gastric sleeve for weight loss.  She was given a work excuse until Monday.  She was also given oxycodone 5 mg for home use.  At no time while in the ED did she appear to be in any acute distress.  She was given return precautions.  She was also given the name of an orthopedist to follow up with.  Have discussed this patient with Dr. Willard                        Clinical Impression:   Final diagnoses:  [R52] Pain  [M25.561, G89.29] Chronic pain of right knee (Primary)        ED Disposition Condition    Discharge Stable          ED Prescriptions       Medication Sig Dispense Start Date End Date Auth. Provider    oxyCODONE-acetaminophen (PERCOCET) 5-325 mg per tablet Take 1 tablet by mouth every 6 (six) hours as needed for Pain. 12 tablet 5/24/2023 -- Lupe Alcantara NP          Follow-up Information       Follow up With Specialties Details Why Contact Info    Santana Kumar II, MD Orthopedic Surgery In 2 days  [address] [place] [address] [address] Jace RODRIGUEZ [id number]  [phone]               Lupe Alcantara NP  05/24/23 1208

## 2023-07-17 ENCOUNTER — PATIENT OUTREACH (OUTPATIENT)
Dept: ADMINISTRATIVE | Facility: HOSPITAL | Age: 43
End: 2023-07-17
Payer: COMMERCIAL

## 2023-07-17 NOTE — PROGRESS NOTES
Population Health Chart Review & Patient Outreach Details:     Reason for Outreach Encounter:     [x]  Non-Compliant Report   []  Payor Report (Humana, PHN, BCBS, MSSP, MCIP, UHC, etc.)   []  Pre-Visit Chart Review     Updates Requested / Reviewed:     []  Care Everywhere    []     []  External Sources ([facility], [facility], DIS, etc.)   []  Care Team Updated    Patient Outreach Method:    [x]  Telephone Outreach Completed   [] Successful   [x] Left Voicemail   [] Unable to Contact (wrong number, no voicemail)  [x]  [facility] Outreach Sent  []  Letter Outreach Mailed  []  Fax Sent for External Records  []  External Records Upload    Health Maintenance Topics Addressed and Outreach Outcomes / Actions Taken:        []      Breast Cancer Screening []  Mammo Scheduled      []  External Records Requested     []  Added Reminder to Complete to Upcoming Primary Care Appt Notes     []  Patient Declined     []  Patient Will Call Back to Schedule     []  Patient Will Schedule with External Provider / Order Routed if Applicable             [x]       Cervical Cancer Screening []  Pap Scheduled      []  External Records Requested     []  Added Reminder to Complete to Upcoming Primary Care Appt Notes     []  Patient Declined     []  Patient Will Call Back to Schedule     []  Patient Will Schedule with External Provider               []          Colorectal Cancer Screening []  Colonoscopy Case Request or Referral Placed     []  External Records Requested     []  Added Reminder to Complete to Upcoming Primary Care Appt Notes     []  Patient Declined     []  Patient Will Call Back to Schedule     []  Patient Will Schedule with External Provider     []  Fit Kit Mailed (add the SmartPhrase under additional notes)     []  Reminded Patient to Complete Home Test             []      Diabetic Eye Exam []  Eye Camera Scheduled or Optometry Referral Placed     []  External Records Requested     []  Added Reminder to Complete  to Upcoming Primary Care Appt Notes     []  Patient Declined     []  Patient Will Call Back to Schedule     []  Patient Will Schedule with External Provider             []      Blood Pressure Control []  Primary Care Follow Up Visit Scheduled     []  Remote Blood Pressure Reading Captured     []  Added Reminder to Complete to Upcoming Primary Care Appt Notes     []  Patient Declined     []  Patient Will Call Back / Patient Will Send Portal Message with Reading     []  Patient Will Call Back to Schedule Provider Visit             []       HbA1c & Other Labs []  Lab Appt Scheduled for Due Labs     []  Primary Care Follow Up Visit Scheduled      []  Reminded Patient to Complete Home Test     []  Added Reminder to Complete to Upcoming Primary Care Appt Notes     []  Patient Declined     []  Patient Will Call Back to Schedule     []  Patient Will Schedule with External Provider / Order Routed if Applicable           []    Schedule Primary Care Appt []  Primary Care Appt Scheduled     []  Patient Declined     []  Patient Will Call Back to Schedule     []  Pt Established with External Provider & Updated Care Team             []      Medication Adherence []  Primary Care Appointment Scheduled     []  Added Reminder to Upcoming Primary Care Appt Notes     []  Patient Reminded to  Prescription     []  Patient Declined, Provider Notified if Needed     []  Sent Provider Message to Review and/or Add Exclusion to Problem List             []      Osteoporosis Screening []  DXA Appointment Scheduled     []  External Records Requested     []  Added Reminder to Complete to Upcoming Primary Care Appt Notes     []  Patient Declined     []  Patient Will Call Back to Schedule     []  Patient Will Schedule with External Provider / Order Routed if Applicable     Additional Care Coordinator Notes:         Further Action Needed If Patient Returns Outreach:

## 2023-07-17 NOTE — LETTER
July 17, 2023    Yadira Thornton  [address]  [place] [address]             [facility]  [address]  [facility] [address]  Phone: [phone] Dear Yadira,     Your [facility] care is dedicated to helping you stay healthy with regular scheduled recommended screenings.  Scheduling routine screenings is important to maintaining good health. Our records indicate that you may be overdue for your screening pap smear.  Pap smear screening can help identify patients at risk for developing cervical cancer at an early stage when it is most likely to be successfully treated.    The current recommendation for a Pap smear screening is every 3-5 years.  We encourage you to schedule your appointment with your women's health provider.   Many women see a Gynecologist for this screening but some primary care providers also provide a Pap smear screening    If you recently had your Pap smear screening outside of [facility], please let your primary care team know so that they can update your health record.  Please send a message to your primary care physician via [email or web address]     If you have questions or want to schedule your screening, please call or send a message via [email or web address].       Sincerely,      Aparna Daly NP and your [facility] Team

## 2024-02-07 DIAGNOSIS — I10 ESSENTIAL HYPERTENSION: ICD-10-CM

## 2024-02-07 RX ORDER — AMLODIPINE BESYLATE 10 MG/1
10 TABLET ORAL
Qty: 90 TABLET | Refills: 0 | OUTPATIENT
Start: 2024-02-07

## 2024-06-05 ENCOUNTER — OFFICE VISIT (OUTPATIENT)
Dept: FAMILY MEDICINE | Facility: CLINIC | Age: 44
End: 2024-06-05
Payer: COMMERCIAL

## 2024-06-05 VITALS
SYSTOLIC BLOOD PRESSURE: 142 MMHG | OXYGEN SATURATION: 97 % | RESPIRATION RATE: 18 BRPM | BODY MASS INDEX: 51.91 KG/M2 | WEIGHT: 293 LBS | HEART RATE: 100 BPM | HEIGHT: 63 IN | DIASTOLIC BLOOD PRESSURE: 88 MMHG | TEMPERATURE: 98 F

## 2024-06-05 DIAGNOSIS — R06.02 SHORTNESS OF BREATH: ICD-10-CM

## 2024-06-05 DIAGNOSIS — Z12.31 SCREENING MAMMOGRAM FOR BREAST CANCER: ICD-10-CM

## 2024-06-05 DIAGNOSIS — G89.29 CHRONIC PAIN OF RIGHT KNEE: ICD-10-CM

## 2024-06-05 DIAGNOSIS — I10 PRIMARY HYPERTENSION: ICD-10-CM

## 2024-06-05 DIAGNOSIS — E66.01 CLASS 3 SEVERE OBESITY DUE TO EXCESS CALORIES WITHOUT SERIOUS COMORBIDITY WITH BODY MASS INDEX (BMI) GREATER THAN OR EQUAL TO 70 IN ADULT: ICD-10-CM

## 2024-06-05 DIAGNOSIS — Z00.00 ENCOUNTER FOR ANNUAL GENERAL MEDICAL EXAMINATION WITHOUT ABNORMAL FINDINGS IN ADULT: Primary | ICD-10-CM

## 2024-06-05 DIAGNOSIS — Z12.11 COLON CANCER SCREENING: ICD-10-CM

## 2024-06-05 DIAGNOSIS — M25.561 CHRONIC PAIN OF RIGHT KNEE: ICD-10-CM

## 2024-06-05 PROCEDURE — 1160F RVW MEDS BY RX/DR IN RCRD: CPT | Mod: CPTII,S$GLB,, | Performed by: STUDENT IN AN ORGANIZED HEALTH CARE EDUCATION/TRAINING PROGRAM

## 2024-06-05 PROCEDURE — 3077F SYST BP >= 140 MM HG: CPT | Mod: CPTII,S$GLB,, | Performed by: STUDENT IN AN ORGANIZED HEALTH CARE EDUCATION/TRAINING PROGRAM

## 2024-06-05 PROCEDURE — G2211 COMPLEX E/M VISIT ADD ON: HCPCS | Mod: S$GLB,,, | Performed by: STUDENT IN AN ORGANIZED HEALTH CARE EDUCATION/TRAINING PROGRAM

## 2024-06-05 PROCEDURE — 99214 OFFICE O/P EST MOD 30 MIN: CPT | Mod: S$GLB,,, | Performed by: STUDENT IN AN ORGANIZED HEALTH CARE EDUCATION/TRAINING PROGRAM

## 2024-06-05 PROCEDURE — 99999 PR PBB SHADOW E&M-EST. PATIENT-LVL IV: CPT | Mod: PBBFAC,,, | Performed by: STUDENT IN AN ORGANIZED HEALTH CARE EDUCATION/TRAINING PROGRAM

## 2024-06-05 PROCEDURE — 3079F DIAST BP 80-89 MM HG: CPT | Mod: CPTII,S$GLB,, | Performed by: STUDENT IN AN ORGANIZED HEALTH CARE EDUCATION/TRAINING PROGRAM

## 2024-06-05 PROCEDURE — 3008F BODY MASS INDEX DOCD: CPT | Mod: CPTII,S$GLB,, | Performed by: STUDENT IN AN ORGANIZED HEALTH CARE EDUCATION/TRAINING PROGRAM

## 2024-06-05 PROCEDURE — 1159F MED LIST DOCD IN RCRD: CPT | Mod: CPTII,S$GLB,, | Performed by: STUDENT IN AN ORGANIZED HEALTH CARE EDUCATION/TRAINING PROGRAM

## 2024-06-05 RX ORDER — AMLODIPINE BESYLATE 10 MG/1
10 TABLET ORAL DAILY
Qty: 90 TABLET | Refills: 3 | Status: SHIPPED | OUTPATIENT
Start: 2024-06-05

## 2024-06-05 RX ORDER — MELOXICAM 7.5 MG/1
15 TABLET ORAL DAILY PRN
Qty: 30 TABLET | Refills: 0 | Status: SHIPPED | OUTPATIENT
Start: 2024-06-05 | End: 2024-09-03

## 2024-06-05 RX ORDER — CARVEDILOL 6.25 MG/1
6.25 TABLET ORAL 2 TIMES DAILY WITH MEALS
Qty: 180 TABLET | Refills: 0 | Status: SHIPPED | OUTPATIENT
Start: 2024-06-05 | End: 2024-09-03

## 2024-06-05 NOTE — PROGRESS NOTES
SUBJECTIVE:    CHIEF COMPLAINT:   Chief Complaint   Patient presents with    Establish Care           274}    HISTORY OF PRESENT ILLNESS:  Yadira Thornton is a 44 y.o. female with HTN, chronic knee pain, history of trigeminal neuralgia and MO (BMI 75) who presents to the clinic today to establish care.    She is seen by sports medicine provider for treatment of chronic bilateral knee pain.  She often takes ibuprofen for pain.    HP noticed during visit patient was speaking in gasping breaths.  She denies any history of asthma, COPD.  She does admit to a history of a heart murmur however she has not been followed by Cardiology or had further workup for concern.  She reports that shortness for breath has been worsening for the past few months.  Denies chest pain, abdominal pain, fevers, chills nausea or vomiting      PAST MEDICAL HISTORY:     274}  Past Medical History:   Diagnosis Date    Chronic knee pain     Hypertension     Obesity        PAST SURGICAL HISTORY:  Past Surgical History:   Procedure Laterality Date     SECTION      x 2       SOCIAL HISTORY:  Social History     Socioeconomic History    Marital status: Single   Occupational History    Occupation: manager     Comment: [employer]   Tobacco Use    Smoking status: Never     Passive exposure: Never    Smokeless tobacco: Never   Substance and Sexual Activity    Alcohol use: Not Currently    Drug use: Never    Sexual activity: Yes     Partners: Male       FAMILY HISTORY:       Family History   Problem Relation Name Age of Onset    Colon cancer Mother      No Known Problems Father      Hypertension Maternal Grandmother      Diabetes Maternal Grandmother         ALLERGIES AND MEDICATIONS: updated and reviewed.      274}  Review of patient's allergies indicates:   Allergen Reactions    Sulfa (sulfonamide antibiotics) Swelling    Carbamazepine Rash    Hydrochlorothiazide Rash    Lisinopril Rash     Medication List with Changes/Refills   New Medications     "CARVEDILOL (COREG) 6.25 MG TABLET    Take 1 tablet (6.25 mg total) by mouth 2 (two) times daily with meals. For Hypertension    MELOXICAM (MOBIC) 7.5 MG TABLET    Take 2 tablets (15 mg total) by mouth daily as needed for Pain (Knee pain). For Joint pain   Changed and/or Refilled Medications    Modified Medication Previous Medication    AMLODIPINE (NORVASC) 10 MG TABLET amLODIPine (NORVASC) 10 MG tablet       Take 1 tablet (10 mg total) by mouth once daily.    Take 1 tablet (10 mg total) by mouth once daily.   Discontinued Medications    FLUTICASONE PROPIONATE (FLONASE) 50 MCG/ACTUATION NASAL SPRAY    1 spray (50 mcg total) by Each Nostril route once daily.    IBUPROFEN (ADVIL,MOTRIN) 800 MG TABLET    Take 1 tablet (800 mg total) by mouth 3 (three) times daily as needed for Pain.    OXYCODONE-ACETAMINOPHEN (PERCOCET) 5-325 MG PER TABLET    Take 1 tablet by mouth every 6 (six) hours as needed for Pain.    PHENYLEPHRINE-DM-GUAIFENESIN (DECONEX DMX) 10-17.5-400 MG TAB    Take 1 tablet by mouth every 4 to 6 hours as needed (Congestion).       SCREENING HISTORY:    274}  Health Maintenance         Date Due Completion Date    Cervical Cancer Screening Never done ---    HIV Screening Never done ---    TETANUS VACCINE Never done ---    Hemoglobin A1c (Diabetic Prevention Screening) Never done ---    Mammogram 11/19/2019 11/19/2018    Colorectal Cancer Screening Never done ---    COVID-19 Vaccine (1 - 2023-24 season) Never done ---    Influenza Vaccine (Season Ended) 09/01/2024 ---    Lipid Panel 08/11/2026 8/11/2021            REVIEW OF SYSTEMS:   Review of Systems   All other systems reviewed and are negative.      PHYSICAL EXAM:      274}  BP (!) 142/88 (BP Location: Left arm, Patient Position: Sitting, BP Method: Large (Manual))   Pulse 100   Temp 98.2 °F (36.8 °C) (Oral)   Resp 18   Ht 5' 3" (1.6 m)   Wt (!) 193.3 kg (426 lb 2.4 oz)   LMP 05/06/2024 (Approximate)   SpO2 97%   BMI 75.49 kg/m²   Wt Readings from " "Last 3 Encounters:   06/05/24 (!) 193.3 kg (426 lb 2.4 oz)   05/24/23 (!) 187.3 kg (413 lb)   09/15/22 (!) 187.4 kg (413 lb 2.3 oz)     BP Readings from Last 3 Encounters:   06/05/24 (!) 142/88   05/24/23 (!) 145/78   09/15/22 134/84     Estimated body mass index is 75.49 kg/m² as calculated from the following:    Height as of this encounter: 5' 3" (1.6 m).    Weight as of this encounter: 193.3 kg (426 lb 2.4 oz).     Physical Exam  Constitutional:       Appearance: Normal appearance.   HENT:      Head: Normocephalic and atraumatic.      Right Ear: External ear normal.      Left Ear: External ear normal.      Nose: Nose normal.   Eyes:      Extraocular Movements: Extraocular movements intact.      Pupils: Pupils are equal, round, and reactive to light.   Cardiovascular:      Rate and Rhythm: Tachycardia present.      Heart sounds: Murmur heard.   Pulmonary:      Effort: Pulmonary effort is normal.      Breath sounds: Normal breath sounds.   Abdominal:      Palpations: Abdomen is soft.   Musculoskeletal:         General: Normal range of motion.   Neurological:      General: No focal deficit present.      Mental Status: She is alert and oriented to person, place, and time.   Psychiatric:         Mood and Affect: Mood normal.         Behavior: Behavior normal.         LABS:   274}  I have reviewed old labs below:  Lab Results   Component Value Date     09/19/2022    K 3.7 09/19/2022     09/19/2022    CALCIUM 9.0 09/19/2022    CO2 25 09/19/2022    GLU 92 09/19/2022    BUN 14 09/19/2022    CREATININE 0.9 09/19/2022    ANIONGAP 10 09/19/2022    ESTGFRAFRICA >60 08/11/2021    EGFRNONAA >60 08/11/2021    PROT 7.7 09/19/2022    ALBUMIN 3.6 09/19/2022    BILITOT 0.3 09/19/2022    ALKPHOS 83 09/19/2022    ALT 16 09/19/2022    AST 18 09/19/2022    CHOL 131 08/11/2021    TRIG 65 08/11/2021    HDL 46 08/11/2021    LDLCALC 72.0 08/11/2021       ASSESSMENT AND PLAN:  274}  1. Encounter for annual general medical " examination without abnormal findings in adult  -     TSH; Future; Expected date: 06/05/2024  -     Lipid Panel; Future; Expected date: 06/05/2024  -     Hemoglobin A1C; Future; Expected date: 06/05/2024  -     Comprehensive Metabolic Panel; Future; Expected date: 06/05/2024  -     CBC Without Differential; Future; Expected date: 06/05/2024  -     Ferritin; Future; Expected date: 06/05/2024    2. Primary hypertension  Comments:  Patient on amlodipine 10 mg p.o. daily however BP with use of NSAIDs.  Will add carvedilol 6.25 mg p.o. b.i.d.  Orders:  -     amLODIPine (NORVASC) 10 MG tablet; Take 1 tablet (10 mg total) by mouth once daily.  Dispense: 90 tablet; Refill: 3  -     carvediloL (COREG) 6.25 MG tablet; Take 1 tablet (6.25 mg total) by mouth 2 (two) times daily with meals. For Hypertension  Dispense: 180 tablet; Refill: 0    3. Screening mammogram for breast cancer  -     Mammo Digital Screening Bilat w/ [facility]; Future; Expected date: 06/05/2024    4. Chronic pain of right knee  Comments:  DC ibuprofen start meloxicam.  Patient is scheduled to follow up with sports Medicine.  Recommended she alternate using Tylenol with medications due to HTN.  Orders:  -     meloxicam (MOBIC) 7.5 MG tablet; Take 2 tablets (15 mg total) by mouth daily as needed for Pain (Knee pain). For Joint pain  Dispense: 30 tablet; Refill: 0    5. Shortness of breath  Comments:  Worsening progressive dyspnea on exertion.  Heart murmur appreciated on exam, ill check echo and chest x-ray.  Orders:  -     Echo Saline Bubble? No; Ultrasound enhancing contrast? No; Future; Expected date: 06/05/2024  -     BNP; Future; Expected date: 06/05/2024    6. Colon cancer screening  -     Case Request Endoscopy: COLONOSCOPY    7. Class 3 severe obesity due to excess calories without serious comorbidity with body mass index (BMI) greater than or equal to 70 in adult  Comments:  BMI 75.  Patient previously referred to Bariatric however consult not affordable  on insurance.  Plans to ask ins about available alternative nutrition consult           Orders Placed This Encounter   Procedures    Mammo Digital Screening Bilat w/ [facility]    TSH    Lipid Panel    Hemoglobin A1C    Comprehensive Metabolic Panel    CBC Without Differential    Ferritin    BNP    Echo Saline Bubble? No; Ultrasound enhancing contrast? No       Follow up in about 3 weeks (around 6/26/2024). or sooner as needed.

## 2024-06-07 ENCOUNTER — TELEPHONE (OUTPATIENT)
Dept: GASTROENTEROLOGY | Facility: CLINIC | Age: 44
End: 2024-06-07
Payer: COMMERCIAL

## 2024-06-07 NOTE — TELEPHONE ENCOUNTER
Call placed to Ms. May in regards to scheduling screening colonoscopy. No answer, left message to return call.

## 2024-06-12 ENCOUNTER — TELEPHONE (OUTPATIENT)
Dept: CARDIOLOGY | Facility: HOSPITAL | Age: 44
End: 2024-06-12

## 2024-06-12 ENCOUNTER — LAB VISIT (OUTPATIENT)
Dept: LAB | Facility: HOSPITAL | Age: 44
End: 2024-06-12
Attending: STUDENT IN AN ORGANIZED HEALTH CARE EDUCATION/TRAINING PROGRAM
Payer: COMMERCIAL

## 2024-06-12 DIAGNOSIS — R06.02 SHORTNESS OF BREATH: ICD-10-CM

## 2024-06-12 DIAGNOSIS — Z00.00 ENCOUNTER FOR ANNUAL GENERAL MEDICAL EXAMINATION WITHOUT ABNORMAL FINDINGS IN ADULT: ICD-10-CM

## 2024-06-12 LAB
ALBUMIN SERPL BCP-MCNC: 3.3 G/DL (ref 3.5–5.2)
ALP SERPL-CCNC: 79 U/L (ref 55–135)
ALT SERPL W/O P-5'-P-CCNC: 11 U/L (ref 10–44)
ANION GAP SERPL CALC-SCNC: 11 MMOL/L (ref 8–16)
AST SERPL-CCNC: 19 U/L (ref 10–40)
BILIRUB SERPL-MCNC: 0.3 MG/DL (ref 0.1–1)
BNP SERPL-MCNC: 74 PG/ML (ref 0–99)
BUN SERPL-MCNC: 10 MG/DL (ref 6–20)
CALCIUM SERPL-MCNC: 8.7 MG/DL (ref 8.7–10.5)
CHLORIDE SERPL-SCNC: 106 MMOL/L (ref 95–110)
CHOLEST SERPL-MCNC: 103 MG/DL (ref 120–199)
CHOLEST/HDLC SERPL: 2.6 {RATIO} (ref 2–5)
CO2 SERPL-SCNC: 23 MMOL/L (ref 23–29)
CREAT SERPL-MCNC: 0.8 MG/DL (ref 0.5–1.4)
ERYTHROCYTE [DISTWIDTH] IN BLOOD BY AUTOMATED COUNT: 17.9 % (ref 11.5–14.5)
EST. GFR  (NO RACE VARIABLE): >60 ML/MIN/1.73 M^2
ESTIMATED AVG GLUCOSE: 114 MG/DL (ref 68–131)
FERRITIN SERPL-MCNC: 5 NG/ML (ref 20–300)
GLUCOSE SERPL-MCNC: 73 MG/DL (ref 70–110)
HBA1C MFR BLD: 5.6 % (ref 4–5.6)
HCT VFR BLD AUTO: 24.2 % (ref 37–48.5)
HDLC SERPL-MCNC: 40 MG/DL (ref 40–75)
HDLC SERPL: 38.8 % (ref 20–50)
HGB BLD-MCNC: 6.4 G/DL (ref 12–16)
LDLC SERPL CALC-MCNC: 52 MG/DL (ref 63–159)
MCH RBC QN AUTO: 17.4 PG (ref 27–31)
MCHC RBC AUTO-ENTMCNC: 26.4 G/DL (ref 32–36)
MCV RBC AUTO: 66 FL (ref 82–98)
NONHDLC SERPL-MCNC: 63 MG/DL
PLATELET # BLD AUTO: 461 K/UL (ref 150–450)
PMV BLD AUTO: 10 FL (ref 9.2–12.9)
POTASSIUM SERPL-SCNC: 4 MMOL/L (ref 3.5–5.1)
PROT SERPL-MCNC: 7.3 G/DL (ref 6–8.4)
RBC # BLD AUTO: 3.68 M/UL (ref 4–5.4)
SODIUM SERPL-SCNC: 140 MMOL/L (ref 136–145)
TRIGL SERPL-MCNC: 55 MG/DL (ref 30–150)
TSH SERPL DL<=0.005 MIU/L-ACNC: 3.06 UIU/ML (ref 0.4–4)
WBC # BLD AUTO: 5.67 K/UL (ref 3.9–12.7)

## 2024-06-12 PROCEDURE — 83880 ASSAY OF NATRIURETIC PEPTIDE: CPT | Performed by: STUDENT IN AN ORGANIZED HEALTH CARE EDUCATION/TRAINING PROGRAM

## 2024-06-12 PROCEDURE — 83036 HEMOGLOBIN GLYCOSYLATED A1C: CPT | Performed by: STUDENT IN AN ORGANIZED HEALTH CARE EDUCATION/TRAINING PROGRAM

## 2024-06-12 PROCEDURE — 80061 LIPID PANEL: CPT | Performed by: STUDENT IN AN ORGANIZED HEALTH CARE EDUCATION/TRAINING PROGRAM

## 2024-06-12 PROCEDURE — 36415 COLL VENOUS BLD VENIPUNCTURE: CPT | Mod: PO | Performed by: STUDENT IN AN ORGANIZED HEALTH CARE EDUCATION/TRAINING PROGRAM

## 2024-06-12 PROCEDURE — 84443 ASSAY THYROID STIM HORMONE: CPT | Performed by: STUDENT IN AN ORGANIZED HEALTH CARE EDUCATION/TRAINING PROGRAM

## 2024-06-12 PROCEDURE — 80053 COMPREHEN METABOLIC PANEL: CPT | Performed by: STUDENT IN AN ORGANIZED HEALTH CARE EDUCATION/TRAINING PROGRAM

## 2024-06-12 PROCEDURE — 82728 ASSAY OF FERRITIN: CPT | Performed by: STUDENT IN AN ORGANIZED HEALTH CARE EDUCATION/TRAINING PROGRAM

## 2024-06-12 PROCEDURE — 85027 COMPLETE CBC AUTOMATED: CPT | Performed by: STUDENT IN AN ORGANIZED HEALTH CARE EDUCATION/TRAINING PROGRAM

## 2024-06-13 ENCOUNTER — TELEPHONE (OUTPATIENT)
Dept: FAMILY MEDICINE | Facility: CLINIC | Age: 44
End: 2024-06-13
Payer: COMMERCIAL

## 2024-06-13 NOTE — TELEPHONE ENCOUNTER
----- Message from Anne Walton DO sent at 6/12/2024  5:55 PM CDT -----  FYI: Call placed to patient on 06/12/2024:    Discussed with patient partial results of her labs have been received and are indicative of severe anemia.  Recommended that she proceed to the emergency department for further evaluation and possible blood transfusion.      Risks discussed with patient if she does not pursue care including risk of further deterioration, syncope, collapse and possibly death.  Patient verbalized understanding    Please follow-up with patient to ensure that she has in fact gone to the emergency department.  We can schedule a lab for follow-up of anemia     Anne Walton DO

## 2024-06-13 NOTE — PROGRESS NOTES
Please follow up with patient regarding visit to ER for treatment of severe anemia and iron-deficiency.      All other available labs have been reviewed at this time, with the exception of CBC, labs are normal.    I do recommend keeping your appointment scheduled within the next few weeks so that we can repeat your blood counts to ensure stability    If you have any further questions please contact our office    Sincerely,    Anne Walton, DO

## 2024-06-18 ENCOUNTER — TELEPHONE (OUTPATIENT)
Dept: FAMILY MEDICINE | Facility: CLINIC | Age: 44
End: 2024-06-18
Payer: COMMERCIAL

## 2024-06-18 NOTE — TELEPHONE ENCOUNTER
Records received summary of care documents via [facility] placed in review folder for PCP to review and advise.

## 2024-06-20 ENCOUNTER — OFFICE VISIT (OUTPATIENT)
Dept: FAMILY MEDICINE | Facility: CLINIC | Age: 44
End: 2024-06-20
Payer: COMMERCIAL

## 2024-06-20 ENCOUNTER — LAB VISIT (OUTPATIENT)
Dept: LAB | Facility: HOSPITAL | Age: 44
End: 2024-06-20
Attending: FAMILY MEDICINE
Payer: COMMERCIAL

## 2024-06-20 VITALS
BODY MASS INDEX: 51.91 KG/M2 | TEMPERATURE: 98 F | WEIGHT: 293 LBS | HEIGHT: 63 IN | OXYGEN SATURATION: 98 % | RESPIRATION RATE: 18 BRPM | SYSTOLIC BLOOD PRESSURE: 124 MMHG | HEART RATE: 88 BPM | DIASTOLIC BLOOD PRESSURE: 68 MMHG

## 2024-06-20 DIAGNOSIS — I10 ESSENTIAL HYPERTENSION: ICD-10-CM

## 2024-06-20 DIAGNOSIS — D50.9 MICROCYTIC ANEMIA: ICD-10-CM

## 2024-06-20 DIAGNOSIS — Z09 HOSPITAL DISCHARGE FOLLOW-UP: Primary | ICD-10-CM

## 2024-06-20 DIAGNOSIS — I51.7 LEFT VENTRICULAR HYPERTROPHY: ICD-10-CM

## 2024-06-20 DIAGNOSIS — I07.1 TRICUSPID VALVE INSUFFICIENCY, UNSPECIFIED ETIOLOGY: ICD-10-CM

## 2024-06-20 LAB
ALBUMIN SERPL BCP-MCNC: 3.5 G/DL (ref 3.5–5.2)
ALP SERPL-CCNC: 85 U/L (ref 55–135)
ALT SERPL W/O P-5'-P-CCNC: 12 U/L (ref 10–44)
ANION GAP SERPL CALC-SCNC: 8 MMOL/L (ref 8–16)
AST SERPL-CCNC: 19 U/L (ref 10–40)
BASOPHILS # BLD AUTO: 0.04 K/UL (ref 0–0.2)
BASOPHILS NFR BLD: 0.7 % (ref 0–1.9)
BILIRUB SERPL-MCNC: 0.3 MG/DL (ref 0.1–1)
BUN SERPL-MCNC: 13 MG/DL (ref 6–20)
CALCIUM SERPL-MCNC: 9.3 MG/DL (ref 8.7–10.5)
CHLORIDE SERPL-SCNC: 103 MMOL/L (ref 95–110)
CO2 SERPL-SCNC: 27 MMOL/L (ref 23–29)
CREAT SERPL-MCNC: 0.9 MG/DL (ref 0.5–1.4)
DIFFERENTIAL METHOD BLD: ABNORMAL
EOSINOPHIL # BLD AUTO: 0.2 K/UL (ref 0–0.5)
EOSINOPHIL NFR BLD: 3.4 % (ref 0–8)
ERYTHROCYTE [DISTWIDTH] IN BLOOD BY AUTOMATED COUNT: 24 % (ref 11.5–14.5)
EST. GFR  (NO RACE VARIABLE): >60 ML/MIN/1.73 M^2
FERRITIN SERPL-MCNC: 11 NG/ML (ref 20–300)
GLUCOSE SERPL-MCNC: 103 MG/DL (ref 70–110)
HCT VFR BLD AUTO: 33.4 % (ref 37–48.5)
HGB BLD-MCNC: 9.2 G/DL (ref 12–16)
IMM GRANULOCYTES # BLD AUTO: 0.01 K/UL (ref 0–0.04)
IMM GRANULOCYTES NFR BLD AUTO: 0.2 % (ref 0–0.5)
IRON SERPL-MCNC: 24 UG/DL (ref 30–160)
LYMPHOCYTES # BLD AUTO: 1.7 K/UL (ref 1–4.8)
LYMPHOCYTES NFR BLD: 28.8 % (ref 18–48)
MCH RBC QN AUTO: 20.1 PG (ref 27–31)
MCHC RBC AUTO-ENTMCNC: 27.5 G/DL (ref 32–36)
MCV RBC AUTO: 73 FL (ref 82–98)
MONOCYTES # BLD AUTO: 0.6 K/UL (ref 0.3–1)
MONOCYTES NFR BLD: 10.2 % (ref 4–15)
NEUTROPHILS # BLD AUTO: 3.4 K/UL (ref 1.8–7.7)
NEUTROPHILS NFR BLD: 56.7 % (ref 38–73)
NRBC BLD-RTO: 0 /100 WBC
PLATELET # BLD AUTO: 420 K/UL (ref 150–450)
PMV BLD AUTO: 9.9 FL (ref 9.2–12.9)
POTASSIUM SERPL-SCNC: 3.6 MMOL/L (ref 3.5–5.1)
PROT SERPL-MCNC: 7.5 G/DL (ref 6–8.4)
RBC # BLD AUTO: 4.58 M/UL (ref 4–5.4)
SATURATED IRON: 4 % (ref 20–50)
SODIUM SERPL-SCNC: 138 MMOL/L (ref 136–145)
TOTAL IRON BINDING CAPACITY: 570 UG/DL (ref 250–450)
TRANSFERRIN SERPL-MCNC: 385 MG/DL (ref 200–375)
WBC # BLD AUTO: 5.9 K/UL (ref 3.9–12.7)

## 2024-06-20 PROCEDURE — 82728 ASSAY OF FERRITIN: CPT | Performed by: FAMILY MEDICINE

## 2024-06-20 PROCEDURE — 99999 PR PBB SHADOW E&M-EST. PATIENT-LVL V: CPT | Mod: PBBFAC,,, | Performed by: FAMILY MEDICINE

## 2024-06-20 PROCEDURE — 80053 COMPREHEN METABOLIC PANEL: CPT | Performed by: FAMILY MEDICINE

## 2024-06-20 PROCEDURE — 85025 COMPLETE CBC W/AUTO DIFF WBC: CPT | Performed by: FAMILY MEDICINE

## 2024-06-20 PROCEDURE — 83540 ASSAY OF IRON: CPT | Performed by: FAMILY MEDICINE

## 2024-06-20 PROCEDURE — 36415 COLL VENOUS BLD VENIPUNCTURE: CPT | Mod: PO | Performed by: FAMILY MEDICINE

## 2024-06-20 NOTE — PROGRESS NOTES
Subjective:       Patient ID: Yadira Thornton is a 44 y.o. female.    Chief Complaint: Hospital Follow Up    Transitional Care Note    Family and/or Caretaker present at visit?  No.  Diagnostic tests reviewed/disposition: No diagnosic tests pending after this hospitalization.  Disease/illness education: anemia  Home health/community services discussion/referrals: Patient does not have home health established from hospital visit.  They do not need home health.  If needed, we will set up home health for the patient.   Establishment or re-establishment of referral orders for community resources: No other necessary community resources.   Discussion with other health care providers: No discussion with other health care providers necessary.        Yadira Thornton presents to the clinic today for hospital follow up for anemia. Patient states she did her echo while in the hospital and this was reviewed in office today. Patient states she is doing well since discharge.   Patient educated on plan of care, verbalized understanding.         Discharge note on 6/13/2024  ADMISSION DIAGNOSIS:   Microcytic anemia [D50.9]     DISCHARGE DIAGNOSES:    Principal Problem:    Microcytic anemia  Active Problems:    Essential hypertension    Morbid obesity (HCC Code)    Heart murmur           HPI: Patient is a very pleasant 44-year-old female with past medical history of hypertension. Presents to the emergency department for PCP found patient to be anemic. Patient works full-time, has noticed dyspnea on exertion. Admits to being tired. Denies any chest pain. Has not had any melena or hematochezia. Has never had a colonoscopy. Does not endorse heavy periods. Denies any reflux or heartburn. Has never smoked and does not drink alcohol. Patient will be admitted to the Medr unit for blood transfusion.      HOSPITAL COURSE:   During hospital stay, the patient got a total of 3 units of PRBC. She improved clinically. She will be referred to Hematology  for further work up and management of her anemia. Patient is in agreement with this plan of care. She was seen and examined at bedside by Dr. Cramer and he determined she was medically stable for discharge.  DC instructions given, including red flag symptoms that would warrant further evaluation.  Patient voiced understanding and agrees with plan of care  It is recommended the patient continue prehospital medication regiment as well as follow-up with primary care provider within 1 to 2 weeks post discharge.  Plan of care and anticipated discharge was discussed in detail with the attending.  Patient was seen and examined on the day of discharge and is medically stable.         ECHO result on 6/13/2024  Findings   --------     Left Ventricle   The estimated LV ejection fraction is 60 %. LV chamber size is normal.   There is moderate concentric LV hypertrophy. LV systolic function is   normal. There are no wall motion abnormalities observed. Normal left   atrial pressure and diastolic function.       Right Ventricle   RV size is normal. The RV systolic function is normal.     Septum   There is no atrial septal defect (ASD). There is no ventricular septal   defect (VSD).       Left Atrium   LA chamber size is normal.     Right Atrium   RA chamber size is normal.     Aortic Valve   There is no aortic regurgitation. There is no aortic valve stenosis.     Mitral Valve   The mitral valve is structurally normal. There is mild mitral   regurgitation. There is no mitral stenosis.       Tricuspid Valve   Tricuspid valve is structurally normal. There is mild to moderate   tricuspid regurgitation. The IVC dilation is within normal limits with   a < 50% collapse, estimated RAP is 8 mmHg. There is no tricuspid   stenosis. Estimated RVSP systolic pressure is 42.02 mmHg. Mild   elevation of estimated RV systolic pressure.       Pulmonary Valve   The pulmonic valve structurally is normal. There is minimal pulmonic   regurgitation.  There is no pulmonic stenosis.       Pericardium   The pericardium is normal. There is no pericardial effusion present.     Aorta   The size of the visualized portion of aortic root is within normal   limits.       Review of Systems   Constitutional:  Negative for activity change, appetite change, chills, diaphoresis and fever.   HENT:  Negative for congestion, ear pain, postnasal drip, sinus pressure, sneezing and sore throat.    Eyes:  Negative for pain, discharge, redness and itching.   Respiratory:  Negative for apnea, cough, chest tightness, shortness of breath and wheezing.    Cardiovascular:  Negative for chest pain and leg swelling.   Gastrointestinal:  Negative for abdominal distention, abdominal pain, constipation, diarrhea, nausea and vomiting.   Genitourinary:  Negative for difficulty urinating, dysuria, flank pain and frequency.   Skin:  Negative for color change, rash and wound.   Neurological:  Negative for dizziness.       Patient Active Problem List   Diagnosis    Trigeminal neuralgia    Essential hypertension    Obesity    Chronic pain of left knee       Objective:      Physical Exam  Vitals reviewed.   Constitutional:       General: She is not in acute distress.     Appearance: Normal appearance. She is well-developed.   HENT:      Head: Normocephalic.      Nose: Nose normal.   Eyes:      Conjunctiva/sclera: Conjunctivae normal.      Pupils: Pupils are equal, round, and reactive to light.   Cardiovascular:      Rate and Rhythm: Normal rate and regular rhythm.      Heart sounds: Normal heart sounds.   Pulmonary:      Effort: Pulmonary effort is normal. No respiratory distress.      Breath sounds: Normal breath sounds.   Musculoskeletal:      Cervical back: Normal range of motion and neck supple.   Skin:     General: Skin is warm and dry.      Findings: No rash.   Neurological:      Mental Status: She is alert and oriented to person, place, and time.   Psychiatric:         Mood and Affect: Mood  "normal.         Behavior: Behavior normal.         Lab Results   Component Value Date    WBC 5.67 06/12/2024    HGB 8.2 (L) 06/13/2024    HCT 26.4 (L) 06/13/2024     (H) 06/12/2024    CHOL 103 (L) 06/12/2024    TRIG 55 06/12/2024    HDL 40 06/12/2024    ALT 11 06/12/2024    AST 19 06/12/2024     06/12/2024    K 4.0 06/12/2024     06/12/2024    CREATININE 0.8 06/12/2024    BUN 10 06/12/2024    CO2 23 06/12/2024    TSH 3.059 06/12/2024    HGBA1C 5.6 06/12/2024     The ASCVD Risk score (Meg CRAMER, et al., 2019) failed to calculate for the following reasons:    The valid total cholesterol range is 130 to 320 mg/dL  Visit Vitals  /68 (BP Location: Right arm, Patient Position: Sitting, BP Method: Large (Manual))   Pulse 88   Temp 97.9 °F (36.6 °C) (Oral)   Resp 18   Ht 5' 3" (1.6 m)   Wt (!) 193.3 kg (426 lb 2.4 oz)   LMP  (Exact Date)   SpO2 98%   BMI 75.49 kg/m²      Assessment:       1. Hospital discharge follow-up    2. Microcytic anemia    3. Essential hypertension    4. Left ventricular hypertrophy    5. Tricuspid valve insufficiency, unspecified etiology    6. Body mass index (BMI) 70 or greater, adult        Plan:       Yadira was seen today for hospital follow up.    Diagnoses and all orders for this visit:    Hospital discharge follow-up / Microcytic anemia  -     Ambulatory referral/consult to Hematology / Oncology; Future  -     CBC Auto Differential; Future  -     IRON AND TIBC; Future  -     FERRITIN; Future  -     COMPREHENSIVE METABOLIC PANEL; Future  Lab work today  Continue medications as prescribed.  Follow up with PCP and hematology    Essential hypertension  -     CBC Auto Differential; Future  -     IRON AND TIBC; Future  -     FERRITIN; Future  -     COMPREHENSIVE METABOLIC PANEL; Future  Stable  Continue medications as prescribed.  Follow up with PCP    Left ventricular hypertrophy / Tricuspid valve insufficiency, unspecified etiology  -     Ambulatory referral/consult to " Cardiology; Future  Continue medications as prescribed.  Follow up with PCP and cardiology    Body mass index (BMI) 70 or greater, adult  Body mass index is 75.49 kg/m².  Continue healthy diet and regular exercise as tolerated.  Continue medications as prescribed.  Follow up with PCP        Follow up if symptoms worsen or fail to improve.      Future Appointments       Date Provider Specialty Appt Notes    7/22/2024 Natacha Walton, DO Family Medicine 3 week f/u    7/22/2024 Christina Bolden MD Cardiology LV hypertrophy and Tricuspid regurg    7/25/2024  Radiology mammo               Tests to Keep You Healthy    Mammogram: SCHEDULED  Colon Cancer Screening: DUE  Cervical Cancer Screening: DUE  Last Blood Pressure <= 139/89 (6/20/2024): Yes

## 2024-06-24 ENCOUNTER — TELEPHONE (OUTPATIENT)
Dept: FAMILY MEDICINE | Facility: CLINIC | Age: 44
End: 2024-06-24
Payer: COMMERCIAL

## 2024-06-24 NOTE — TELEPHONE ENCOUNTER
----- Message from Aparna Daly NP sent at 6/21/2024  8:15 AM CDT -----  Anemia is improved but I suggest continue oral iron supplement and continue with previous plan of following up with hematology

## 2024-06-25 ENCOUNTER — OFFICE VISIT (OUTPATIENT)
Dept: HEMATOLOGY/ONCOLOGY | Facility: CLINIC | Age: 44
End: 2024-06-25
Payer: COMMERCIAL

## 2024-06-25 VITALS
TEMPERATURE: 97 F | WEIGHT: 293 LBS | BODY MASS INDEX: 74.32 KG/M2 | HEART RATE: 94 BPM | SYSTOLIC BLOOD PRESSURE: 149 MMHG | DIASTOLIC BLOOD PRESSURE: 65 MMHG | RESPIRATION RATE: 16 BRPM

## 2024-06-25 DIAGNOSIS — D50.9 IRON DEFICIENCY ANEMIA, UNSPECIFIED IRON DEFICIENCY ANEMIA TYPE: Primary | ICD-10-CM

## 2024-06-25 DIAGNOSIS — D50.9 MICROCYTIC ANEMIA: ICD-10-CM

## 2024-06-25 PROCEDURE — 3008F BODY MASS INDEX DOCD: CPT | Mod: CPTII,S$GLB,, | Performed by: INTERNAL MEDICINE

## 2024-06-25 PROCEDURE — 3044F HG A1C LEVEL LT 7.0%: CPT | Mod: CPTII,S$GLB,, | Performed by: INTERNAL MEDICINE

## 2024-06-25 PROCEDURE — 3078F DIAST BP <80 MM HG: CPT | Mod: CPTII,S$GLB,, | Performed by: INTERNAL MEDICINE

## 2024-06-25 PROCEDURE — 99999 PR PBB SHADOW E&M-EST. PATIENT-LVL III: CPT | Mod: PBBFAC,,, | Performed by: INTERNAL MEDICINE

## 2024-06-25 PROCEDURE — 3077F SYST BP >= 140 MM HG: CPT | Mod: CPTII,S$GLB,, | Performed by: INTERNAL MEDICINE

## 2024-06-25 PROCEDURE — 99205 OFFICE O/P NEW HI 60 MIN: CPT | Mod: S$GLB,,, | Performed by: INTERNAL MEDICINE

## 2024-06-25 PROCEDURE — 1159F MED LIST DOCD IN RCRD: CPT | Mod: CPTII,S$GLB,, | Performed by: INTERNAL MEDICINE

## 2024-06-25 RX ORDER — HEPARIN 100 UNIT/ML
5 SYRINGE INTRAVENOUS
OUTPATIENT
Start: 2024-06-25

## 2024-06-25 RX ORDER — ACETAMINOPHEN 325 MG/1
650 TABLET ORAL
Start: 2024-06-25

## 2024-06-25 RX ORDER — DIPHENHYDRAMINE HYDROCHLORIDE 50 MG/ML
50 INJECTION INTRAMUSCULAR; INTRAVENOUS ONCE AS NEEDED
OUTPATIENT
Start: 2024-06-25

## 2024-06-25 RX ORDER — SODIUM CHLORIDE 0.9 % (FLUSH) 0.9 %
10 SYRINGE (ML) INJECTION
OUTPATIENT
Start: 2024-06-25

## 2024-06-25 RX ORDER — SODIUM CHLORIDE 9 MG/ML
INJECTION, SOLUTION INTRAVENOUS CONTINUOUS
OUTPATIENT
Start: 2024-06-25

## 2024-06-25 RX ORDER — EPINEPHRINE 0.3 MG/.3ML
0.3 INJECTION SUBCUTANEOUS ONCE AS NEEDED
OUTPATIENT
Start: 2024-06-25

## 2024-06-25 NOTE — PROGRESS NOTES
HPI      44-year-old female with history of chronic knee problem hypertension obesity was referred to Hematology Clinic for evaluation of iron deficiency anemia.  Patient denies any heavy menstrual cycle.  Patient denies any GI bleeding.  Patient denies any history of gastric bypass surgery colitis or autoimmune disease.    Past Medical History:   Diagnosis Date    Chronic knee pain     Hypertension     Obesity      Social History     Socioeconomic History    Marital status: Single   Occupational History    Occupation: manager     Comment: [employer]   Tobacco Use    Smoking status: Never     Passive exposure: Never    Smokeless tobacco: Never   Substance and Sexual Activity    Alcohol use: Not Currently    Drug use: Never    Sexual activity: Yes     Partners: Male     Social Determinants of Health     Financial Resource Strain: Patient Declined (6/12/2024)    Received from [facility]    Overall Financial Resource Strain (CARDIA)     Difficulty of Paying Living Expenses: Patient declined   Food Insecurity: Patient Declined (6/12/2024)    Received from [facility]    Hunger Vital Sign     Worried About Running Out of Food in the Last Year: Patient declined     Ran Out of Food in the Last Year: Patient declined   Transportation Needs: Patient Declined (6/12/2024)    Received from [facility]    PRAPARE - Transportation     Lack of Transportation (Medical): Patient declined     Lack of Transportation (Non-Medical): Patient declined   Physical Activity: Patient Declined (6/12/2024)    Received from [facility]    Exercise Vital Sign     Days of Exercise per Week: Patient declined     Minutes of Exercise per Session: Patient declined   Stress: Patient Declined (6/12/2024)    Received from [facility]  - Occupational Stress Questionnaire     Feeling of Stress : Patient declined   Housing Stability: Patient Declined (6/12/2024)    Received from [facility]    Housing Stability Vital Sign     Unable to Pay for Housing in the Last Year: Patient declined     Homeless in the Last Year: Patient declined         Subjective      Review of Systems   Constitutional: Negative for appetite change, fatigue and unexpected weight change.   HENT: Negative for mouth sores.   Eyes: Negative for visual disturbance.   Respiratory: Negative for cough and shortness of breath.   Cardiovascular: Negative for chest pain.   Gastrointestinal: Negative for diarrhea.   Genitourinary: Negative for frequency.   Musculoskeletal: Negative for back pain.   Skin: Negative for rash.   Neurological: Negative for headaches.   Hematological: Negative for adenopathy.   Psychiatric/Behavioral: The patient is not nervous/anxious.   All other systems reviewed and are negative.     Objective    Physical Exam   Vitals:    06/25/24 1302   BP: (!) 149/65   Pulse: 94   Resp: 16   Temp: 97.1 °F (36.2 °C)       Morbidly obesity  Awake alert no acute distress  Normocephalic atraumatic  Nonfocal  No rash no lesion      Lab Results   Component Value Date    WBC 5.90 06/20/2024    HGB 9.2 (L) 06/20/2024    HCT 33.4 (L) 06/20/2024    MCV 73 (L) 06/20/2024     06/20/2024       CMP  Sodium   Date Value Ref Range Status   06/20/2024 138 136 - 145 mmol/L Final     Potassium   Date Value Ref Range Status   06/20/2024 3.6 3.5 - 5.1 mmol/L Final     Chloride   Date Value Ref Range Status   06/20/2024 103 95 - 110 mmol/L Final     CO2   Date Value Ref Range Status   06/20/2024 27 23 - 29 mmol/L Final     Glucose   Date Value Ref Range Status   06/20/2024 103 70 - 110 mg/dL Final     BUN   Date Value Ref Range Status   06/20/2024 13 6 - 20 mg/dL Final     Creatinine   Date Value Ref Range Status   06/20/2024 0.9 0.5 - 1.4 mg/dL Final      Calcium   Date Value Ref Range Status   06/20/2024 9.3 8.7 - 10.5 mg/dL Final     Total Protein   Date Value Ref Range Status   06/20/2024 7.5 6.0 - 8.4 g/dL Final     Albumin   Date Value Ref Range Status   06/20/2024 3.5 3.5 - 5.2 g/dL Final     Total Bilirubin   Date Value Ref Range Status   06/20/2024 0.3 0.1 - 1.0 mg/dL Final     Comment:     For infants and newborns, interpretation of results should be based  on gestational age, weight and in agreement with clinical  observations.    Premature Infant recommended reference ranges:  Up to 24 hours.............<8.0 mg/dL  Up to 48 hours............<12.0 mg/dL  3-5 days..................<15.0 mg/dL  6-29 days.................<15.0 mg/dL       Alkaline Phosphatase   Date Value Ref Range Status   06/20/2024 85 55 - 135 U/L Final     AST   Date Value Ref Range Status   06/20/2024 19 10 - 40 U/L Final     ALT   Date Value Ref Range Status   06/20/2024 12 10 - 44 U/L Final     Anion Gap   Date Value Ref Range Status   06/20/2024 8 8 - 16 mmol/L Final     eGFR   Date Value Ref Range Status   06/20/2024 >60.0 >60 mL/min/1.73 m^2 Final       Assessment    Iron deficiency anemia unclear of etiology or source.    Morbidly obesity    Hypertension    > start on IV iron therapy Injectafer weekly x2.  With premedication Tylenol  > referred to GI for iron deficiency anemia  > we will see patient after completion of IV iron therapy  > primary care follow-up for obesity    Plan    Microcytic anemia  -     Ambulatory referral/consult to Hematology / Oncology

## 2024-07-08 ENCOUNTER — INFUSION (OUTPATIENT)
Dept: INFUSION THERAPY | Facility: HOSPITAL | Age: 44
End: 2024-07-08
Attending: INTERNAL MEDICINE
Payer: COMMERCIAL

## 2024-07-08 VITALS
BODY MASS INDEX: 51.91 KG/M2 | DIASTOLIC BLOOD PRESSURE: 63 MMHG | RESPIRATION RATE: 16 BRPM | TEMPERATURE: 98 F | HEART RATE: 64 BPM | SYSTOLIC BLOOD PRESSURE: 134 MMHG | HEIGHT: 63 IN | OXYGEN SATURATION: 100 % | WEIGHT: 293 LBS

## 2024-07-08 DIAGNOSIS — E66.9 OBESITY, UNSPECIFIED CLASSIFICATION, UNSPECIFIED OBESITY TYPE, UNSPECIFIED WHETHER SERIOUS COMORBIDITY PRESENT: ICD-10-CM

## 2024-07-08 DIAGNOSIS — D50.9 MICROCYTIC ANEMIA: Primary | ICD-10-CM

## 2024-07-08 PROCEDURE — 25000003 PHARM REV CODE 250: Performed by: INTERNAL MEDICINE

## 2024-07-08 PROCEDURE — 63600175 PHARM REV CODE 636 W HCPCS: Mod: JZ,JG | Performed by: INTERNAL MEDICINE

## 2024-07-08 RX ORDER — SODIUM CHLORIDE 0.9 % (FLUSH) 0.9 %
10 SYRINGE (ML) INJECTION
Status: DISCONTINUED | OUTPATIENT
Start: 2024-07-08 | End: 2024-07-08 | Stop reason: HOSPADM

## 2024-07-08 RX ORDER — ACETAMINOPHEN 325 MG/1
650 TABLET ORAL
Start: 2024-07-15

## 2024-07-08 RX ORDER — SODIUM CHLORIDE 9 MG/ML
INJECTION, SOLUTION INTRAVENOUS CONTINUOUS
OUTPATIENT
Start: 2024-07-15

## 2024-07-08 RX ORDER — SODIUM CHLORIDE 9 MG/ML
INJECTION, SOLUTION INTRAVENOUS CONTINUOUS
Status: DISCONTINUED | OUTPATIENT
Start: 2024-07-08 | End: 2024-07-08 | Stop reason: HOSPADM

## 2024-07-08 RX ORDER — DIPHENHYDRAMINE HYDROCHLORIDE 50 MG/ML
50 INJECTION INTRAMUSCULAR; INTRAVENOUS ONCE AS NEEDED
OUTPATIENT
Start: 2024-07-15

## 2024-07-08 RX ORDER — HEPARIN 100 UNIT/ML
5 SYRINGE INTRAVENOUS
Status: DISCONTINUED | OUTPATIENT
Start: 2024-07-08 | End: 2024-07-08 | Stop reason: HOSPADM

## 2024-07-08 RX ORDER — ACETAMINOPHEN 325 MG/1
650 TABLET ORAL
Status: DISCONTINUED | OUTPATIENT
Start: 2024-07-08 | End: 2024-07-08 | Stop reason: HOSPADM

## 2024-07-08 RX ORDER — HEPARIN 100 UNIT/ML
5 SYRINGE INTRAVENOUS
OUTPATIENT
Start: 2024-07-15

## 2024-07-08 RX ORDER — EPINEPHRINE 0.3 MG/.3ML
0.3 INJECTION SUBCUTANEOUS ONCE AS NEEDED
OUTPATIENT
Start: 2024-07-15

## 2024-07-08 RX ORDER — SODIUM CHLORIDE 0.9 % (FLUSH) 0.9 %
10 SYRINGE (ML) INJECTION
OUTPATIENT
Start: 2024-07-15

## 2024-07-08 RX ADMIN — FERRIC CARBOXYMALTOSE INJECTION 750 MG: 50 INJECTION, SOLUTION INTRAVENOUS at 02:07

## 2024-07-15 ENCOUNTER — INFUSION (OUTPATIENT)
Dept: INFUSION THERAPY | Facility: HOSPITAL | Age: 44
End: 2024-07-15
Attending: INTERNAL MEDICINE
Payer: COMMERCIAL

## 2024-07-15 VITALS
DIASTOLIC BLOOD PRESSURE: 78 MMHG | RESPIRATION RATE: 16 BRPM | BODY MASS INDEX: 51.91 KG/M2 | HEIGHT: 63 IN | SYSTOLIC BLOOD PRESSURE: 142 MMHG | HEART RATE: 79 BPM | OXYGEN SATURATION: 99 % | TEMPERATURE: 98 F | WEIGHT: 293 LBS

## 2024-07-15 DIAGNOSIS — D50.9 MICROCYTIC ANEMIA: Primary | ICD-10-CM

## 2024-07-15 DIAGNOSIS — E66.9 OBESITY, UNSPECIFIED CLASSIFICATION, UNSPECIFIED OBESITY TYPE, UNSPECIFIED WHETHER SERIOUS COMORBIDITY PRESENT: ICD-10-CM

## 2024-07-15 PROCEDURE — 25000003 PHARM REV CODE 250: Performed by: INTERNAL MEDICINE

## 2024-07-15 PROCEDURE — 63600175 PHARM REV CODE 636 W HCPCS: Mod: JZ,JG | Performed by: INTERNAL MEDICINE

## 2024-07-15 PROCEDURE — 96365 THER/PROPH/DIAG IV INF INIT: CPT

## 2024-07-15 RX ORDER — ACETAMINOPHEN 325 MG/1
650 TABLET ORAL
Start: 2024-07-15

## 2024-07-15 RX ORDER — SODIUM CHLORIDE 0.9 % (FLUSH) 0.9 %
10 SYRINGE (ML) INJECTION
Status: DISCONTINUED | OUTPATIENT
Start: 2024-07-15 | End: 2024-07-15

## 2024-07-15 RX ORDER — SODIUM CHLORIDE 9 MG/ML
INJECTION, SOLUTION INTRAVENOUS CONTINUOUS
Status: DISCONTINUED | OUTPATIENT
Start: 2024-07-15 | End: 2024-07-15

## 2024-07-15 RX ORDER — DIPHENHYDRAMINE HYDROCHLORIDE 50 MG/ML
50 INJECTION INTRAMUSCULAR; INTRAVENOUS ONCE AS NEEDED
Status: DISCONTINUED | OUTPATIENT
Start: 2024-07-15 | End: 2024-07-15

## 2024-07-15 RX ORDER — DIPHENHYDRAMINE HYDROCHLORIDE 50 MG/ML
50 INJECTION INTRAMUSCULAR; INTRAVENOUS ONCE AS NEEDED
OUTPATIENT
Start: 2024-07-15

## 2024-07-15 RX ORDER — EPINEPHRINE 0.3 MG/.3ML
0.3 INJECTION SUBCUTANEOUS ONCE AS NEEDED
OUTPATIENT
Start: 2024-07-15

## 2024-07-15 RX ORDER — SODIUM CHLORIDE 9 MG/ML
INJECTION, SOLUTION INTRAVENOUS CONTINUOUS
Status: CANCELLED | OUTPATIENT
Start: 2024-07-15

## 2024-07-15 RX ORDER — HEPARIN 100 UNIT/ML
5 SYRINGE INTRAVENOUS
Status: CANCELLED | OUTPATIENT
Start: 2024-07-15

## 2024-07-15 RX ORDER — SODIUM CHLORIDE 0.9 % (FLUSH) 0.9 %
10 SYRINGE (ML) INJECTION
Status: CANCELLED | OUTPATIENT
Start: 2024-07-15

## 2024-07-15 RX ORDER — ACETAMINOPHEN 325 MG/1
650 TABLET ORAL
Status: COMPLETED | OUTPATIENT
Start: 2024-07-15 | End: 2024-07-15

## 2024-07-15 RX ORDER — HEPARIN 100 UNIT/ML
5 SYRINGE INTRAVENOUS
Status: DISCONTINUED | OUTPATIENT
Start: 2024-07-15 | End: 2024-07-15

## 2024-07-15 RX ORDER — EPINEPHRINE 0.3 MG/.3ML
0.3 INJECTION SUBCUTANEOUS ONCE AS NEEDED
Status: DISCONTINUED | OUTPATIENT
Start: 2024-07-15 | End: 2024-07-15

## 2024-07-15 RX ADMIN — ACETAMINOPHEN 650 MG: 325 TABLET ORAL at 02:07

## 2024-07-15 RX ADMIN — SODIUM CHLORIDE: 9 INJECTION, SOLUTION INTRAVENOUS at 02:07

## 2024-07-15 RX ADMIN — FERRIC CARBOXYMALTOSE INJECTION 750 MG: 50 INJECTION, SOLUTION INTRAVENOUS at 02:07

## 2024-07-17 ENCOUNTER — PATIENT MESSAGE (OUTPATIENT)
Dept: HEMATOLOGY/ONCOLOGY | Facility: CLINIC | Age: 44
End: 2024-07-17
Payer: COMMERCIAL

## 2024-07-22 ENCOUNTER — LAB VISIT (OUTPATIENT)
Dept: LAB | Facility: HOSPITAL | Age: 44
End: 2024-07-22
Attending: INTERNAL MEDICINE
Payer: COMMERCIAL

## 2024-07-22 ENCOUNTER — OFFICE VISIT (OUTPATIENT)
Dept: FAMILY MEDICINE | Facility: CLINIC | Age: 44
End: 2024-07-22
Payer: COMMERCIAL

## 2024-07-22 VITALS
BODY MASS INDEX: 51.91 KG/M2 | OXYGEN SATURATION: 97 % | DIASTOLIC BLOOD PRESSURE: 70 MMHG | WEIGHT: 293 LBS | HEIGHT: 63 IN | HEART RATE: 92 BPM | RESPIRATION RATE: 14 BRPM | SYSTOLIC BLOOD PRESSURE: 112 MMHG | TEMPERATURE: 98 F

## 2024-07-22 DIAGNOSIS — D50.9 IRON DEFICIENCY ANEMIA, UNSPECIFIED IRON DEFICIENCY ANEMIA TYPE: ICD-10-CM

## 2024-07-22 DIAGNOSIS — I10 ESSENTIAL HYPERTENSION: ICD-10-CM

## 2024-07-22 DIAGNOSIS — D50.9 IRON DEFICIENCY ANEMIA, UNSPECIFIED IRON DEFICIENCY ANEMIA TYPE: Primary | ICD-10-CM

## 2024-07-22 DIAGNOSIS — D50.9 MICROCYTIC ANEMIA: ICD-10-CM

## 2024-07-22 PROBLEM — R01.1 HEART MURMUR: Status: ACTIVE | Noted: 2024-06-13

## 2024-07-22 LAB
BASOPHILS # BLD AUTO: 0.03 K/UL (ref 0–0.2)
BASOPHILS NFR BLD: 0.4 % (ref 0–1.9)
DIFFERENTIAL METHOD BLD: ABNORMAL
EOSINOPHIL # BLD AUTO: 0.1 K/UL (ref 0–0.5)
EOSINOPHIL NFR BLD: 1.9 % (ref 0–8)
ERYTHROCYTE [DISTWIDTH] IN BLOOD BY AUTOMATED COUNT: 29.4 % (ref 11.5–14.5)
FERRITIN SERPL-MCNC: 741 NG/ML (ref 20–300)
HCT VFR BLD AUTO: 38.2 % (ref 37–48.5)
HGB BLD-MCNC: 11.2 G/DL (ref 12–16)
IMM GRANULOCYTES # BLD AUTO: 0.02 K/UL (ref 0–0.04)
IMM GRANULOCYTES NFR BLD AUTO: 0.3 % (ref 0–0.5)
IRON SERPL-MCNC: 70 UG/DL (ref 30–160)
LYMPHOCYTES # BLD AUTO: 1.8 K/UL (ref 1–4.8)
LYMPHOCYTES NFR BLD: 24.2 % (ref 18–48)
MCH RBC QN AUTO: 23.2 PG (ref 27–31)
MCHC RBC AUTO-ENTMCNC: 29.3 G/DL (ref 32–36)
MCV RBC AUTO: 79 FL (ref 82–98)
MONOCYTES # BLD AUTO: 0.6 K/UL (ref 0.3–1)
MONOCYTES NFR BLD: 8.1 % (ref 4–15)
NEUTROPHILS # BLD AUTO: 4.9 K/UL (ref 1.8–7.7)
NEUTROPHILS NFR BLD: 65.1 % (ref 38–73)
NRBC BLD-RTO: 0 /100 WBC
PLATELET # BLD AUTO: 280 K/UL (ref 150–450)
PMV BLD AUTO: 9.2 FL (ref 9.2–12.9)
RBC # BLD AUTO: 4.83 M/UL (ref 4–5.4)
SATURATED IRON: 18 % (ref 20–50)
TOTAL IRON BINDING CAPACITY: 396 UG/DL (ref 250–450)
TRANSFERRIN SERPL-MCNC: 283 MG/DL (ref 200–375)
WBC # BLD AUTO: 7.45 K/UL (ref 3.9–12.7)

## 2024-07-22 PROCEDURE — 3008F BODY MASS INDEX DOCD: CPT | Mod: CPTII,S$GLB,, | Performed by: STUDENT IN AN ORGANIZED HEALTH CARE EDUCATION/TRAINING PROGRAM

## 2024-07-22 PROCEDURE — 82728 ASSAY OF FERRITIN: CPT | Performed by: INTERNAL MEDICINE

## 2024-07-22 PROCEDURE — 99396 PREV VISIT EST AGE 40-64: CPT | Mod: S$GLB,,, | Performed by: STUDENT IN AN ORGANIZED HEALTH CARE EDUCATION/TRAINING PROGRAM

## 2024-07-22 PROCEDURE — 83540 ASSAY OF IRON: CPT | Performed by: INTERNAL MEDICINE

## 2024-07-22 PROCEDURE — 1160F RVW MEDS BY RX/DR IN RCRD: CPT | Mod: CPTII,S$GLB,, | Performed by: STUDENT IN AN ORGANIZED HEALTH CARE EDUCATION/TRAINING PROGRAM

## 2024-07-22 PROCEDURE — 3074F SYST BP LT 130 MM HG: CPT | Mod: CPTII,S$GLB,, | Performed by: STUDENT IN AN ORGANIZED HEALTH CARE EDUCATION/TRAINING PROGRAM

## 2024-07-22 PROCEDURE — 36415 COLL VENOUS BLD VENIPUNCTURE: CPT | Mod: PO | Performed by: INTERNAL MEDICINE

## 2024-07-22 PROCEDURE — 3078F DIAST BP <80 MM HG: CPT | Mod: CPTII,S$GLB,, | Performed by: STUDENT IN AN ORGANIZED HEALTH CARE EDUCATION/TRAINING PROGRAM

## 2024-07-22 PROCEDURE — 1159F MED LIST DOCD IN RCRD: CPT | Mod: CPTII,S$GLB,, | Performed by: STUDENT IN AN ORGANIZED HEALTH CARE EDUCATION/TRAINING PROGRAM

## 2024-07-22 PROCEDURE — 99999 PR PBB SHADOW E&M-EST. PATIENT-LVL III: CPT | Mod: PBBFAC,,, | Performed by: STUDENT IN AN ORGANIZED HEALTH CARE EDUCATION/TRAINING PROGRAM

## 2024-07-22 PROCEDURE — 85025 COMPLETE CBC W/AUTO DIFF WBC: CPT | Mod: PO | Performed by: INTERNAL MEDICINE

## 2024-07-22 PROCEDURE — 3044F HG A1C LEVEL LT 7.0%: CPT | Mod: CPTII,S$GLB,, | Performed by: STUDENT IN AN ORGANIZED HEALTH CARE EDUCATION/TRAINING PROGRAM

## 2024-07-22 NOTE — PROGRESS NOTES
SUBJECTIVE:    CHIEF COMPLAINT:   Chief Complaint   Patient presents with    Follow-up     3wk f/u7           274}    HISTORY OF PRESENT ILLNESS:  Yadira Thornton is a 44 y.o. female with iron-deficiency anemia, hypertension, trigeminal neuralgia who presents to the clinic today for follow-up.    She has been seen by Hematology and is presently receiving iron infusions.  She reports that she is feeling much better since infusions have started.  Her shortness of breath is much better but improved      PAST MEDICAL HISTORY:     274}  Past Medical History:   Diagnosis Date    Chronic knee pain     Hypertension     Obesity        PAST SURGICAL HISTORY:  Past Surgical History:   Procedure Laterality Date     SECTION      x 2       SOCIAL HISTORY:  Social History     Socioeconomic History    Marital status: Single   Occupational History    Occupation: manager     Comment: [employer]   Tobacco Use    Smoking status: Never     Passive exposure: Never    Smokeless tobacco: Never   Substance and Sexual Activity    Alcohol use: Not Currently    Drug use: Never    Sexual activity: Yes     Partners: Male     Social Determinants of Health     Financial Resource Strain: Patient Declined (2024)    Received from [facility]    Overall Financial Resource Strain (CARDIA)     Difficulty of Paying Living Expenses: Patient declined   Food Insecurity: Patient Declined (2024)    Received from [facility]    Hunger Vital Sign     Worried About Running Out of Food in the Last Year: Patient declined     Ran Out of Food in the Last Year: Patient declined   Transportation Needs: Patient Declined (2024)    Received from [facility]    PRAPARE - Transportation     Lack of Transportation (Medical): Patient declined     Lack of Transportation (Non-Medical): Patient declined   Physical Activity: Patient Declined (2024)     Received from [facility]    Exercise Vital Sign     Days of Exercise per Week: Patient declined     Minutes of Exercise per Session: Patient declined   Stress: Patient Declined (6/12/2024)    Received from [facility]    [place] [place] [facility] [employer] [facility] - Occupational Stress Questionnaire     Feeling of Stress : Patient declined   Housing Stability: Patient Declined (6/12/2024)    Received from [facility]    Housing Stability Vital Sign     Unable to Pay for Housing in the Last Year: Patient declined     Homeless in the Last Year: Patient declined       FAMILY HISTORY:       Family History   Problem Relation Name Age of Onset    Colon cancer Mother      No Known Problems Father      Hypertension Maternal Grandmother      Diabetes Maternal Grandmother         ALLERGIES AND MEDICATIONS: updated and reviewed.      274}  Review of patient's allergies indicates:   Allergen Reactions    Sulfa (sulfonamide antibiotics) Swelling    Carbamazepine Rash    Hydrochlorothiazide Rash    Lisinopril Rash     Medication List with Changes/Refills   Current Medications    AMLODIPINE (NORVASC) 10 MG TABLET    Take 1 tablet (10 mg total) by mouth once daily.    CARVEDILOL (COREG) 6.25 MG TABLET    Take 1 tablet (6.25 mg total) by mouth 2 (two) times daily with meals. For Hypertension       SCREENING HISTORY:    274}  Health Maintenance         Date Due Completion Date    Cervical Cancer Screening Never done ---    HIV Screening Never done ---    TETANUS VACCINE Never done ---    Mammogram 11/19/2019 11/19/2018    Colorectal Cancer Screening Never done ---    COVID-19 Vaccine (1 - 2023-24 season) Never done ---    Influenza Vaccine (1) 09/01/2024 ---    Hemoglobin A1c (Diabetic Prevention Screening) 06/12/2027 6/12/2024    Lipid Panel 06/12/2029 6/12/2024            REVIEW OF SYSTEMS:   Review of Systems    PHYSICAL EXAM:  "     274}  /70   Pulse 92   Temp 97.8 °F (36.6 °C) (Oral)   Resp 14   Ht 5' 3" (1.6 m)   Wt (!) 187.9 kg (414 lb 3.9 oz)   LMP 07/08/2024 (Exact Date)   SpO2 97%   BMI 73.38 kg/m²   Wt Readings from Last 3 Encounters:   07/22/24 (!) 187.9 kg (414 lb 3.9 oz)   07/15/24 (!) 187.4 kg (413 lb 3.2 oz)   07/08/24 (!) 190 kg (418 lb 14.4 oz)     BP Readings from Last 3 Encounters:   07/22/24 112/70   07/15/24 (!) 142/78   07/08/24 134/63     Estimated body mass index is 73.38 kg/m² as calculated from the following:    Height as of this encounter: 5' 3" (1.6 m).    Weight as of this encounter: 187.9 kg (414 lb 3.9 oz).     Physical Exam  Vitals reviewed.   Constitutional:       Appearance: Normal appearance. She is obese.   HENT:      Head: Normocephalic and atraumatic.      Right Ear: External ear normal.      Nose: Nose normal.   Eyes:      Extraocular Movements: Extraocular movements intact.      Conjunctiva/sclera: Conjunctivae normal.      Pupils: Pupils are equal, round, and reactive to light.   Cardiovascular:      Rate and Rhythm: Normal rate and regular rhythm.      Pulses: Normal pulses.      Heart sounds: Normal heart sounds.   Pulmonary:      Effort: No respiratory distress.      Breath sounds: Normal breath sounds. No wheezing.   Musculoskeletal:         General: Normal range of motion.      Cervical back: Normal range of motion.   Neurological:      Mental Status: She is alert and oriented to person, place, and time. Mental status is at baseline.   Psychiatric:         Mood and Affect: Mood normal.         Judgment: Judgment normal.         LABS:   274}  I have reviewed old labs below:  Lab Results   Component Value Date    WBC 5.90 06/20/2024    HGB 9.2 (L) 06/20/2024    HCT 33.4 (L) 06/20/2024    MCV 73 (L) 06/20/2024     06/20/2024     06/20/2024    K 3.6 06/20/2024     06/20/2024    CALCIUM 9.3 06/20/2024    CO2 27 06/20/2024     06/20/2024    BUN 13 06/20/2024    " CREATININE 0.9 06/20/2024    ANIONGAP 8 06/20/2024    ESTGFRAFRICA >60 08/11/2021    EGFRNONAA >60 08/11/2021    PROT 7.5 06/20/2024    ALBUMIN 3.5 06/20/2024    BILITOT 0.3 06/20/2024    ALKPHOS 85 06/20/2024    ALT 12 06/20/2024    AST 19 06/20/2024    CHOL 103 (L) 06/12/2024    TRIG 55 06/12/2024    HDL 40 06/12/2024    LDLCALC 52.0 (L) 06/12/2024    TSH 3.059 06/12/2024    HGBA1C 5.6 06/12/2024       ASSESSMENT AND PLAN:  274}  1. Iron deficiency anemia, unspecified iron deficiency anemia type  -     CBC W/ AUTO DIFFERENTIAL; Future; Expected date: 07/22/2024  -     IRON AND TIBC; Future; Expected date: 07/22/2024  -     FERRITIN; Future; Expected date: 07/22/2024    2. Essential hypertension  Comments:  Controlled on amlodipine, Coreg therapy           Orders Placed This Encounter   Procedures    CBC W/ AUTO DIFFERENTIAL    IRON AND TIBC    FERRITIN       Follow up in about 3 months (around 10/22/2024) for f/u Cardio, and Hematology. or sooner as needed.

## 2024-07-25 NOTE — PROGRESS NOTES
FYI: Call placed to patient on 06/12/2024:    Discussed with patient partial results of her labs have been received and are indicative of severe anemia.  Recommended that she proceed to the emergency department for further evaluation and possible blood transfusion.      Risks discussed with patient if she does not pursue care including risk of further deterioration, syncope, collapse and possibly death.  Patient verbalized understanding    Please follow-up with patient to ensure that she has in fact gone to the emergency department.  We can schedule a lab for follow-up of anemia    Anne Walton DO Will follow-up pending results of lab work.  Not currently on a supplement.

## 2024-07-31 ENCOUNTER — OFFICE VISIT (OUTPATIENT)
Dept: HEMATOLOGY/ONCOLOGY | Facility: CLINIC | Age: 44
End: 2024-07-31
Payer: COMMERCIAL

## 2024-07-31 VITALS
BODY MASS INDEX: 51.91 KG/M2 | HEART RATE: 88 BPM | DIASTOLIC BLOOD PRESSURE: 77 MMHG | OXYGEN SATURATION: 100 % | SYSTOLIC BLOOD PRESSURE: 144 MMHG | HEIGHT: 63 IN | RESPIRATION RATE: 16 BRPM | WEIGHT: 293 LBS | TEMPERATURE: 97 F

## 2024-07-31 DIAGNOSIS — D50.9 MICROCYTIC ANEMIA: Primary | ICD-10-CM

## 2024-07-31 DIAGNOSIS — D50.9 IRON DEFICIENCY ANEMIA, UNSPECIFIED IRON DEFICIENCY ANEMIA TYPE: ICD-10-CM

## 2024-07-31 DIAGNOSIS — E66.9 OBESITY, UNSPECIFIED CLASSIFICATION, UNSPECIFIED OBESITY TYPE, UNSPECIFIED WHETHER SERIOUS COMORBIDITY PRESENT: ICD-10-CM

## 2024-07-31 PROCEDURE — 3008F BODY MASS INDEX DOCD: CPT | Mod: CPTII,S$GLB,, | Performed by: INTERNAL MEDICINE

## 2024-07-31 PROCEDURE — G2211 COMPLEX E/M VISIT ADD ON: HCPCS | Mod: S$GLB,,, | Performed by: INTERNAL MEDICINE

## 2024-07-31 PROCEDURE — 99999 PR PBB SHADOW E&M-EST. PATIENT-LVL III: CPT | Mod: PBBFAC,,, | Performed by: INTERNAL MEDICINE

## 2024-07-31 PROCEDURE — 1159F MED LIST DOCD IN RCRD: CPT | Mod: CPTII,S$GLB,, | Performed by: INTERNAL MEDICINE

## 2024-07-31 PROCEDURE — 3077F SYST BP >= 140 MM HG: CPT | Mod: CPTII,S$GLB,, | Performed by: INTERNAL MEDICINE

## 2024-07-31 PROCEDURE — 99213 OFFICE O/P EST LOW 20 MIN: CPT | Mod: S$GLB,,, | Performed by: INTERNAL MEDICINE

## 2024-07-31 PROCEDURE — 3044F HG A1C LEVEL LT 7.0%: CPT | Mod: CPTII,S$GLB,, | Performed by: INTERNAL MEDICINE

## 2024-07-31 PROCEDURE — 3078F DIAST BP <80 MM HG: CPT | Mod: CPTII,S$GLB,, | Performed by: INTERNAL MEDICINE

## 2024-07-31 NOTE — PROGRESS NOTES
HPI      44-year-old female with history of chronic knee problem hypertension obesity was referred to [facility] for evaluation of iron deficiency anemia.  Patient denies any heavy menstrual cycle.  Patient denies any GI bleeding.  Patient denies any history of gastric bypass surgery colitis or autoimmune disease.    Past Medical History:   Diagnosis Date    Chronic knee pain     Hypertension     Obesity      Social History     Socioeconomic History    Marital status: Single   Occupational History    Occupation: manager     Comment: [employer]   Tobacco Use    Smoking status: Never     Passive exposure: Never    Smokeless tobacco: Never   Substance and Sexual Activity    Alcohol use: Not Currently    Drug use: Never    Sexual activity: Yes     Partners: Male     Social Determinants of Health     Financial Resource Strain: Patient Declined (6/12/2024)    Received from [facility]    Overall Financial Resource Strain (CARDIA)     Difficulty of Paying Living Expenses: Patient declined   Food Insecurity: Patient Declined (6/12/2024)    Received from [facility] and [facility]    Hunger Vital Sign     Worried About Running Out of Food in the Last Year: Patient declined     Ran Out of Food in the Last Year: Patient declined   Transportation Needs: Patient Declined (6/12/2024)    Received from [facility] and [facility]    PRAPARE - Transportation     Lack of Transportation (Medical): Patient declined     Lack of Transportation (Non-Medical): Patient declined   Physical Activity: Patient Declined (6/12/2024)    Received from [facility] and [facility]    Exercise Vital Sign     Days of Exercise per Week: Patient declined     Minutes of Exercise per Session: Patient declined   Stress: Patient Declined (6/12/2024)    Received from [facility] and [facility]  - Occupational Stress Questionnaire     Feeling of Stress : Patient declined   Housing Stability: Patient Declined (6/12/2024)    Received from [facility] and [facility]    Housing Stability Vital Sign     Unable to Pay for Housing in the Last Year: Patient declined     Homeless in the Last Year: Patient declined         Subjective      Review of Systems   Constitutional: Negative for appetite change, fatigue and unexpected weight change.   HENT: Negative for mouth sores.   Eyes: Negative for visual disturbance.   Respiratory: Negative for cough and shortness of breath.   Cardiovascular: Negative for chest pain.   Gastrointestinal: Negative for diarrhea.   Genitourinary: Negative for frequency.   Musculoskeletal: Negative for back pain.   Skin: Negative for rash.   Neurological: Negative for headaches.   Hematological: Negative for adenopathy.   Psychiatric/Behavioral: The patient is not nervous/anxious.   All other systems reviewed and are negative.     Objective    Physical Exam   Vitals:    07/31/24 0907   BP: (!) 144/77   Pulse: 88   Resp: 16   Temp: 96.6 °F (35.9 °C)       Morbidly obesity  Awake alert no acute distress  Normocephalic atraumatic  Nonfocal  No rash no lesion      Lab Results   Component Value Date    WBC 7.45 07/22/2024    HGB 11.2 (L) 07/22/2024    HCT 38.2 07/22/2024    MCV 79 (L) 07/22/2024     07/22/2024       CMP  Sodium   Date Value Ref Range Status   06/20/2024 138 136 - 145 mmol/L Final     Potassium   Date Value Ref Range Status   06/20/2024 3.6 3.5 - 5.1 mmol/L Final     Chloride   Date Value Ref Range Status   06/20/2024 103 95 - 110 mmol/L Final     CO2   Date Value Ref Range Status   06/20/2024 27 23 - 29 mmol/L Final     Glucose   Date Value Ref Range Status   06/20/2024 103 70 - 110 mg/dL Final     BUN   Date Value Ref Range Status   06/20/2024 13 6 - 20 mg/dL Final     Creatinine   Date Value Ref Range Status   06/20/2024 0.9 0.5 - 1.4 mg/dL Final      Calcium   Date Value Ref Range Status   06/20/2024 9.3 8.7 - 10.5 mg/dL Final     Total Protein   Date Value Ref Range Status   06/20/2024 7.5 6.0 - 8.4 g/dL Final     Albumin   Date Value Ref Range Status   06/20/2024 3.5 3.5 - 5.2 g/dL Final     Total Bilirubin   Date Value Ref Range Status   06/20/2024 0.3 0.1 - 1.0 mg/dL Final     Comment:     For infants and newborns, interpretation of results should be based  on gestational age, weight and in agreement with clinical  observations.    Premature Infant recommended reference ranges:  Up to 24 hours.............<8.0 mg/dL  Up to 48 hours............<12.0 mg/dL  3-5 days..................<15.0 mg/dL  6-29 days.................<15.0 mg/dL       Alkaline Phosphatase   Date Value Ref Range Status   06/20/2024 85 55 - 135 U/L Final     AST   Date Value Ref Range Status   06/20/2024 19 10 - 40 U/L Final     ALT   Date Value Ref Range Status   06/20/2024 12 10 - 44 U/L Final     Anion Gap   Date Value Ref Range Status   06/20/2024 8 8 - 16 mmol/L Final     eGFR   Date Value Ref Range Status   06/20/2024 >60.0 >60 mL/min/1.73 m^2 Final       Assessment    Iron deficiency anemia unclear of etiology or source.    Morbidly obesity    Hypertension    > start on IV iron therapy Injectafer weekly x2.  With premedication Tylenol - follow up visit shows a good respond   > referred to GI for iron deficiency anemia  > RTC 3 month with iron study - however patient reports that follow up visit is too expensive and unnecessary.  She prefer follow as p.r.n.  > primary care follow-up for obesity    Plan    There are no diagnoses linked to this encounter.

## 2024-08-05 ENCOUNTER — PATIENT MESSAGE (OUTPATIENT)
Dept: ADMINISTRATIVE | Facility: HOSPITAL | Age: 44
End: 2024-08-05
Payer: COMMERCIAL

## 2024-08-07 DIAGNOSIS — N93.9 ABNORMAL UTERINE BLEEDING: Primary | ICD-10-CM

## 2024-08-07 LAB
HUMAN PAPILLOMAVIRUS (HPV): NORMAL
PAP RECOMMENDATION EXT: NORMAL
PAP SMEAR: NORMAL

## 2024-08-15 ENCOUNTER — HOSPITAL ENCOUNTER (OUTPATIENT)
Dept: RADIOLOGY | Facility: HOSPITAL | Age: 44
Discharge: HOME | End: 2024-08-15
Attending: SPECIALIST
Payer: COMMERCIAL

## 2024-08-15 DIAGNOSIS — N93.9 ABNORMAL UTERINE BLEEDING: ICD-10-CM

## 2024-08-15 PROCEDURE — 76856 US EXAM PELVIC COMPLETE: CPT | Mod: 26,,, | Performed by: RADIOLOGY

## 2024-08-15 PROCEDURE — 76830 TRANSVAGINAL US NON-OB: CPT | Mod: 26,,, | Performed by: RADIOLOGY

## 2024-08-15 PROCEDURE — 76856 US EXAM PELVIC COMPLETE: CPT | Mod: TC

## 2024-08-21 ENCOUNTER — HOSPITAL ENCOUNTER (OUTPATIENT)
Dept: RADIOLOGY | Facility: HOSPITAL | Age: 44
Discharge: HOME | End: 2024-08-21
Attending: STUDENT IN AN ORGANIZED HEALTH CARE EDUCATION/TRAINING PROGRAM
Payer: COMMERCIAL

## 2024-08-21 DIAGNOSIS — Z12.31 SCREENING MAMMOGRAM FOR BREAST CANCER: ICD-10-CM

## 2024-08-21 PROCEDURE — 77067 SCR MAMMO BI INCL CAD: CPT | Mod: TC

## 2024-08-21 PROCEDURE — 77067 SCR MAMMO BI INCL CAD: CPT | Mod: 26,,, | Performed by: RADIOLOGY

## 2024-08-21 PROCEDURE — 77063 BREAST TOMOSYNTHESIS BI: CPT | Mod: 26,,, | Performed by: RADIOLOGY

## 2024-09-05 ENCOUNTER — PATIENT MESSAGE (OUTPATIENT)
Dept: GASTROENTEROLOGY | Facility: CLINIC | Age: 44
End: 2024-09-05
Payer: COMMERCIAL

## 2024-09-12 ENCOUNTER — PATIENT OUTREACH (OUTPATIENT)
Dept: ADMINISTRATIVE | Facility: HOSPITAL | Age: 44
End: 2024-09-12
Payer: COMMERCIAL

## 2024-10-23 ENCOUNTER — OFFICE VISIT (OUTPATIENT)
Dept: FAMILY MEDICINE | Facility: CLINIC | Age: 44
End: 2024-10-23
Payer: COMMERCIAL

## 2024-10-23 ENCOUNTER — LAB VISIT (OUTPATIENT)
Dept: LAB | Facility: HOSPITAL | Age: 44
End: 2024-10-23
Attending: STUDENT IN AN ORGANIZED HEALTH CARE EDUCATION/TRAINING PROGRAM
Payer: COMMERCIAL

## 2024-10-23 VITALS
HEIGHT: 63 IN | OXYGEN SATURATION: 99 % | RESPIRATION RATE: 18 BRPM | SYSTOLIC BLOOD PRESSURE: 124 MMHG | TEMPERATURE: 98 F | BODY MASS INDEX: 51.91 KG/M2 | WEIGHT: 293 LBS | DIASTOLIC BLOOD PRESSURE: 62 MMHG | HEART RATE: 93 BPM

## 2024-10-23 DIAGNOSIS — I10 HYPERTENSION, UNSPECIFIED TYPE: Primary | ICD-10-CM

## 2024-10-23 DIAGNOSIS — D25.9 UTERINE LEIOMYOMA, UNSPECIFIED LOCATION: ICD-10-CM

## 2024-10-23 DIAGNOSIS — E66.01 MORBID OBESITY WITH BMI OF 70 AND OVER, ADULT: ICD-10-CM

## 2024-10-23 DIAGNOSIS — D50.9 IRON DEFICIENCY ANEMIA, UNSPECIFIED IRON DEFICIENCY ANEMIA TYPE: ICD-10-CM

## 2024-10-23 LAB
IRON SERPL-MCNC: 37 UG/DL (ref 30–160)
SATURATED IRON: 9 % (ref 20–50)
TOTAL IRON BINDING CAPACITY: 428 UG/DL (ref 250–450)
TRANSFERRIN SERPL-MCNC: 289 MG/DL (ref 200–375)

## 2024-10-23 PROCEDURE — 99214 OFFICE O/P EST MOD 30 MIN: CPT | Mod: S$GLB,,, | Performed by: STUDENT IN AN ORGANIZED HEALTH CARE EDUCATION/TRAINING PROGRAM

## 2024-10-23 PROCEDURE — 3078F DIAST BP <80 MM HG: CPT | Mod: CPTII,S$GLB,, | Performed by: STUDENT IN AN ORGANIZED HEALTH CARE EDUCATION/TRAINING PROGRAM

## 2024-10-23 PROCEDURE — G2211 COMPLEX E/M VISIT ADD ON: HCPCS | Mod: S$GLB,,, | Performed by: STUDENT IN AN ORGANIZED HEALTH CARE EDUCATION/TRAINING PROGRAM

## 2024-10-23 PROCEDURE — 36415 COLL VENOUS BLD VENIPUNCTURE: CPT | Mod: PO | Performed by: STUDENT IN AN ORGANIZED HEALTH CARE EDUCATION/TRAINING PROGRAM

## 2024-10-23 PROCEDURE — 85025 COMPLETE CBC W/AUTO DIFF WBC: CPT | Performed by: STUDENT IN AN ORGANIZED HEALTH CARE EDUCATION/TRAINING PROGRAM

## 2024-10-23 PROCEDURE — 3008F BODY MASS INDEX DOCD: CPT | Mod: CPTII,S$GLB,, | Performed by: STUDENT IN AN ORGANIZED HEALTH CARE EDUCATION/TRAINING PROGRAM

## 2024-10-23 PROCEDURE — 3074F SYST BP LT 130 MM HG: CPT | Mod: CPTII,S$GLB,, | Performed by: STUDENT IN AN ORGANIZED HEALTH CARE EDUCATION/TRAINING PROGRAM

## 2024-10-23 PROCEDURE — 3044F HG A1C LEVEL LT 7.0%: CPT | Mod: CPTII,S$GLB,, | Performed by: STUDENT IN AN ORGANIZED HEALTH CARE EDUCATION/TRAINING PROGRAM

## 2024-10-23 PROCEDURE — 1159F MED LIST DOCD IN RCRD: CPT | Mod: CPTII,S$GLB,, | Performed by: STUDENT IN AN ORGANIZED HEALTH CARE EDUCATION/TRAINING PROGRAM

## 2024-10-23 PROCEDURE — 99999 PR PBB SHADOW E&M-EST. PATIENT-LVL IV: CPT | Mod: PBBFAC,,, | Performed by: STUDENT IN AN ORGANIZED HEALTH CARE EDUCATION/TRAINING PROGRAM

## 2024-10-23 PROCEDURE — 84466 ASSAY OF TRANSFERRIN: CPT | Performed by: STUDENT IN AN ORGANIZED HEALTH CARE EDUCATION/TRAINING PROGRAM

## 2024-10-23 RX ORDER — TIRZEPATIDE 5 MG/.5ML
5 INJECTION, SOLUTION SUBCUTANEOUS
Qty: 2 ML | Refills: 1 | Status: SHIPPED | OUTPATIENT
Start: 2024-11-13 | End: 2025-01-12

## 2024-10-23 RX ORDER — TIRZEPATIDE 2.5 MG/.5ML
2.5 INJECTION, SOLUTION SUBCUTANEOUS
Qty: 2 ML | Refills: 0 | Status: SHIPPED | OUTPATIENT
Start: 2024-10-23 | End: 2024-11-22

## 2024-10-23 NOTE — PROGRESS NOTES
SUBJECTIVE:    CHIEF COMPLAINT:   Chief Complaint   Patient presents with    Follow-up           274}    HISTORY OF PRESENT ILLNESS:  History of Present Illness    CHIEF COMPLAINT:  Ms. Thornton presents for a follow-up visit to discuss recent improvements in blood count, iron infusion results, and to explore weight loss options.    HPI:  Ms. Thornton reports improvement since her last iron infusion, noting decreased cold sensitivity and cessation of ice cravings. She mentions ongoing knee pain and swelling, which she attributes to amlodipine. The swelling subsided when she discontinued the medication for approximately a week but returned upon resumption, worsening with prolonged standing at work.    Ms. Thornton works extended shifts, typically from 03:00a or 05:00p. She expresses interest in weight loss, citing knee pain as motivation. Her diet varies, including chicken, salads, burgers, and pizza. She enjoys certain vegetables such as cabbage, green beans, and small amounts of broccoli or cauliflower when combined with other foods.    Ms. Thornton reports frequent bowel movements, particularly after dining out, with urgency approximately 30-40 minutes post-meal. She denies diarrhea or bleeding.    Regarding GYN health, the patient underwent an ultrasound 2 months ago due to abnormal uterine bleeding, revealing a 5-centimeter uterine fibroid, potentially explaining her previous severe anemia. She reports not receiving follow-up communication regarding these results.    Ms. Thornton has not yet scheduled a recommended colonoscopy. She reveals a family history of colon cancer, reporting her mother's death from the disease at age 27.    SOCIAL HISTORY:  Ms. Thornton works early morning shifts, typically from 03:00 or 05:00 to 14:00.      ROS:  General: -fever, -chills, -fatigue, -weight gain, -weight loss  Eyes: -vision changes, -redness, -discharge  ENT: -ear pain, -nasal congestion, -sore throat  Cardiovascular: -chest pain,  -palpitations, -lower extremity edema  Respiratory: -cough, -shortness of breath  Gastrointestinal: -abdominal pain, -nausea, -vomiting, -diarrhea, -constipation, -blood in stool  Genitourinary: -dysuria, -hematuria, -frequency  Musculoskeletal: +joint pain, -muscle pain, +joint swelling  Skin: -rash, -lesion  Neurological: -headache, -dizziness, -numbness, -tingling  Psychiatric: -anxiety, -depression, -sleep difficulty           PAST MEDICAL HISTORY:     274}  Past Medical History:   Diagnosis Date    Chronic knee pain     Hypertension     Obesity        PAST SURGICAL HISTORY:  Past Surgical History:   Procedure Laterality Date     SECTION      x 2       SOCIAL HISTORY:  Social History     Socioeconomic History    Marital status: Single   Occupational History    Occupation: manager     Comment: [employer]   Tobacco Use    Smoking status: Never     Passive exposure: Never    Smokeless tobacco: Never   Substance and Sexual Activity    Alcohol use: Not Currently    Drug use: Never    Sexual activity: Yes     Partners: Male     Social Drivers of Health     Financial Resource Strain: Patient Declined (2024)    Received from [facility] and [facility]    Overall Financial Resource Strain (CARDIA)     Difficulty of Paying Living Expenses: Patient declined   Food Insecurity: Patient Declined (2024)    Received from [facility]    Hunger Vital Sign     Worried About Running Out of Food in the Last Year: Patient declined     Ran Out of Food in the Last Year: Patient declined   Transportation Needs: Patient Declined (2024)    Received from [facility] and [facility]    PRAPARE - Transportation     Lack of Transportation (Medical): Patient declined     Lack of Transportation (Non-Medical): Patient declined   Physical Activity: Patient Declined (2024)    Received from [facility] and [facility]    Exercise  Vital Sign     Days of Exercise per Week: Patient declined     Minutes of Exercise per Session: Patient declined   Stress: Patient Declined (6/12/2024)    Received from [facility] [place] [facility] - Occupational Stress Questionnaire     Feeling of Stress : Patient declined   Housing Stability: Patient Declined (6/12/2024)    Received from [facility] and [facility]    Housing Stability Vital Sign     Unable to Pay for Housing in the Last Year: Patient declined     Homeless in the Last Year: Patient declined       FAMILY HISTORY:       Family History   Problem Relation Name Age of Onset    Colon cancer Mother      No Known Problems Father      Hypertension Maternal Grandmother      Diabetes Maternal Grandmother         ALLERGIES AND MEDICATIONS: updated and reviewed.      274}  Review of patient's allergies indicates:   Allergen Reactions    Sulfa (sulfonamide antibiotics) Swelling    Carbamazepine Rash    Hydrochlorothiazide Rash    Lisinopril Rash     Medication List with Changes/Refills   New Medications    TIRZEPATIDE, WEIGHT LOSS, (ZEPBOUND) 2.5 MG/0.5 ML PNIJ    Inject 2.5 mg into the skin every 7 days. For Weight loss: Start 2.5mg  once per week for 4 weeks, then Start 5mg weekly    TIRZEPATIDE, WEIGHT LOSS, (ZEPBOUND) 5 MG/0.5 ML PNIJ    Inject 5 mg into the skin every 7 days. For Weight loss: Start 2.5mg  once per week for 4 weeks, then Start 5mg weekly   Current Medications    AMLODIPINE (NORVASC) 10 MG TABLET    Take 1 tablet (10 mg total) by mouth once daily.    CARVEDILOL (COREG) 6.25 MG TABLET    Take 1 tablet (6.25 mg total) by mouth 2 (two) times daily with meals. For Hypertension       SCREENING HISTORY:    274}  Health Maintenance         Date Due Completion Date    HIV Screening Never done ---    TETANUS VACCINE Never done ---    Colorectal Cancer Screening Never done ---    Influenza Vaccine (1) Never done ---     "COVID-19 Vaccine (1 - 2024-25 season) Never done ---    Mammogram 08/21/2025 8/21/2024    Hemoglobin A1c (Diabetic Prevention Screening) 06/12/2027 6/12/2024    Lipid Panel 06/12/2029 6/12/2024    Cervical Cancer Screening 08/07/2029 8/7/2024    RSV Vaccine (Age 60+ and Pregnant patients) (1 - 1-dose 75+ series) 02/09/2053 ---                  PHYSICAL EXAM:      274}  /62 (BP Location: Right forearm, Patient Position: Sitting)   Pulse 93   Temp 98.2 °F (36.8 °C) (Oral)   Resp 18   Ht 5' 3" (1.6 m)   Wt (!) 198.3 kg (437 lb 2.8 oz)   LMP 09/27/2024 (Exact Date)   SpO2 99%   BMI 77.44 kg/m²   Wt Readings from Last 3 Encounters:   10/23/24 (!) 198.3 kg (437 lb 2.8 oz)   07/31/24 (!) 189.3 kg (417 lb 5.3 oz)   07/22/24 (!) 187.9 kg (414 lb 3.9 oz)     BP Readings from Last 3 Encounters:   10/23/24 124/62   07/31/24 (!) 144/77   07/22/24 112/70     Estimated body mass index is 77.44 kg/m² as calculated from the following:    Height as of this encounter: 5' 3" (1.6 m).    Weight as of this encounter: 198.3 kg (437 lb 2.8 oz).       Physical Exam    General: No acute distress. Well-developed. Well-nourished.  Eyes: EOMI. Sclerae anicteric.  HENT: Normocephalic. Atraumatic. Nares patent. Moist oral mucosa.  Cardiovascular: Regular rate. Regular rhythm. +murmurs. Normal S1, S2.  Respiratory: Normal respiratory effort. Clear to auscultation bilaterally. No rales. No rhonchi. No wheezing.  Abdomen: Soft. Non-tender. Non-distended. Normoactive bowel sounds.  Musculoskeletal: No  obvious deformity.  Neurological: Alert & oriented x3. No slurred speech. Normal gait.  Psychiatric: Normal mood. Normal affect. Good insight. Good judgment.  Skin: Warm. Dry. No rash.  TEST RESULTS:  Ms. Thornton's blood count has improved since her last visit. Her iron levels were previously low, necessitating an iron infusion. An HPV test in September was negative. Ms. Thornton reports completing an echocardiogram, but the results are not " found in the system.         LABS:   274}  I have reviewed old labs below:  Lab Results   Component Value Date    WBC 7.45 07/22/2024    HGB 11.2 (L) 07/22/2024    HCT 38.2 07/22/2024    MCV 79 (L) 07/22/2024     07/22/2024     06/20/2024    K 3.6 06/20/2024     06/20/2024    CALCIUM 9.3 06/20/2024    CO2 27 06/20/2024     06/20/2024    BUN 13 06/20/2024    CREATININE 0.9 06/20/2024    ANIONGAP 8 06/20/2024    ESTGFRAFRICA >60 08/11/2021    EGFRNONAA >60 08/11/2021    PROT 7.5 06/20/2024    ALBUMIN 3.5 06/20/2024    BILITOT 0.3 06/20/2024    ALKPHOS 85 06/20/2024    ALT 12 06/20/2024    AST 19 06/20/2024    CHOL 103 (L) 06/12/2024    TRIG 55 06/12/2024    HDL 40 06/12/2024    LDLCALC 52.0 (L) 06/12/2024    TSH 3.059 06/12/2024    HGBA1C 5.6 06/12/2024       ASSESSMENT AND PLAN:  274}    Hypertension, unspecified type  Comments:  Controlled on amlodipine and carvedilol.  Continue present medications    Iron deficiency anemia, unspecified iron deficiency anemia type  Comments:  s/p iron infusion therapy.  Check iron, TIBC, CBC and ferritin level    Uterine leiomyoma, unspecified location  Comments:  Noted per recent ultrasound.  Referral to gyn  Orders:  -     CBC W/ AUTO DIFFERENTIAL; Future; Expected date: 10/23/2024  -     IRON AND TIBC; Future; Expected date: 10/23/2024  -     Ambulatory referral/consult to Obstetrics / Gynecology; Future; Expected date: 10/24/2024    Morbid obesity with BMI of 70 and over, adult  Comments:  BMI 77.  Discussed weight loss goals recommended starting GLP 1 therapy, exercise 3-4 times per week for 30-40 minutes.  Mediterranean, Paleo diets suggested  Orders:  -     tirzepatide, weight loss, (ZEPBOUND) 2.5 mg/0.5 mL PnIj; Inject 2.5 mg into the skin every 7 days. For Weight loss: Start 2.5mg  once per week for 4 weeks, then Start 5mg weekly  Dispense: 2 mL; Refill: 0  -     tirzepatide, weight loss, (ZEPBOUND) 5 mg/0.5 mL PnIj; Inject 5 mg into the skin every  7 days. For Weight loss: Start 2.5mg  once per week for 4 weeks, then Start 5mg weekly  Dispense: 2 mL; Refill: 1          Assessment & Plan    IMPRESSION:   Assessed patient's anemia, noting improvement in blood count since iron infusion   Evaluated uterine fibroid as likely cause of severe anemia and abnormal uterine bleeding   Considered weight loss medication options, recommending Zepbound (tirzepatide) over Wegovy due to potentially better efficacy   Reviewed family history of colon cancer, determining need for colonoscopy rather than Cologuard screening   Considered possibility of Crohn's disease or irritable bowel syndrome based on reported post-meal bathroom urgency    WEIGHT MANAGEMENT:   Explained GLP-1 receptor agonist medications (semaglutide, tirzepatide) and their mechanisms for weight loss.   Discussed potential side effects of weight loss medications, including nausea and constipation.   Decrease food portion sizes by half when starting weight loss medication.   Increase water intake to help manage potential constipation from weight loss medication.   Started Zepbound (tirzepatide) for weight loss, pending insurance approval.   Follow up in 8 weeks to assess weight loss medication efficacy and side effects.   Contact the office if experiencing significant side effects from weight loss medication.    COLON CANCER SCREENING:   Explained importance of colonoscopy for colon cancer screening, especially given family history.    GASTROINTESTINAL DISORDERS:   Explained difference between diverticulitis and Crohn's disease.   Consider adding Metamucil or Benefiber to diet to help with frequent bowel movements.    HYPERTENSION:   Continued amlodipine; will consider changing to losartan if swelling persists.    LABS:   Ordered iron and CBC labs.    CARDIAC EVALUATION:   Ordered echocardiogram (to be requested from previous provider if already completed).    UTERINE FIBROID:   Referred patient to gynecology for  evaluation and treatment options for uterine fibroid.         Orders Placed This Encounter   Procedures    CBC W/ AUTO DIFFERENTIAL    IRON AND TIBC    Ambulatory referral/consult to Obstetrics / Gynecology         This note was generated with the assistance of ambient listening technology. Verbal consent was obtained by the patient and accompanying visitor(s) for the recording of patient appointment to facilitate this note. I attest to having reviewed and edited the generated note for accuracy, though some syntax or spelling errors may persist. Please contact the author of this note for any clarification.    I spent a total of 32 minutes on the day of the visit.

## 2024-10-24 LAB
BASOPHILS # BLD AUTO: 0.03 K/UL (ref 0–0.2)
BASOPHILS NFR BLD: 0.5 % (ref 0–1.9)
DIFFERENTIAL METHOD BLD: ABNORMAL
EOSINOPHIL # BLD AUTO: 0.2 K/UL (ref 0–0.5)
EOSINOPHIL NFR BLD: 2.9 % (ref 0–8)
ERYTHROCYTE [DISTWIDTH] IN BLOOD BY AUTOMATED COUNT: 13.2 % (ref 11.5–14.5)
HCT VFR BLD AUTO: 39 % (ref 37–48.5)
HGB BLD-MCNC: 11.9 G/DL (ref 12–16)
IMM GRANULOCYTES # BLD AUTO: 0.01 K/UL (ref 0–0.04)
IMM GRANULOCYTES NFR BLD AUTO: 0.2 % (ref 0–0.5)
LYMPHOCYTES # BLD AUTO: 2.1 K/UL (ref 1–4.8)
LYMPHOCYTES NFR BLD: 37.1 % (ref 18–48)
MCH RBC QN AUTO: 28 PG (ref 27–31)
MCHC RBC AUTO-ENTMCNC: 30.5 G/DL (ref 32–36)
MCV RBC AUTO: 92 FL (ref 82–98)
MONOCYTES # BLD AUTO: 0.5 K/UL (ref 0.3–1)
MONOCYTES NFR BLD: 8.3 % (ref 4–15)
NEUTROPHILS # BLD AUTO: 2.8 K/UL (ref 1.8–7.7)
NEUTROPHILS NFR BLD: 51 % (ref 38–73)
NRBC BLD-RTO: 0 /100 WBC
PLATELET # BLD AUTO: 272 K/UL (ref 150–450)
PMV BLD AUTO: 11 FL (ref 9.2–12.9)
RBC # BLD AUTO: 4.25 M/UL (ref 4–5.4)
WBC # BLD AUTO: 5.55 K/UL (ref 3.9–12.7)

## 2024-10-31 ENCOUNTER — TELEPHONE (OUTPATIENT)
Dept: FAMILY MEDICINE | Facility: CLINIC | Age: 44
End: 2024-10-31
Payer: COMMERCIAL

## 2024-11-01 ENCOUNTER — TELEPHONE (OUTPATIENT)
Dept: FAMILY MEDICINE | Facility: CLINIC | Age: 44
End: 2024-11-01
Payer: COMMERCIAL

## 2024-11-13 ENCOUNTER — PATIENT MESSAGE (OUTPATIENT)
Dept: FAMILY MEDICINE | Facility: CLINIC | Age: 44
End: 2024-11-13
Payer: COMMERCIAL

## 2024-12-18 ENCOUNTER — OFFICE VISIT (OUTPATIENT)
Dept: FAMILY MEDICINE | Facility: CLINIC | Age: 44
End: 2024-12-18
Payer: COMMERCIAL

## 2024-12-18 ENCOUNTER — PATIENT MESSAGE (OUTPATIENT)
Dept: GASTROENTEROLOGY | Facility: CLINIC | Age: 44
End: 2024-12-18
Payer: COMMERCIAL

## 2024-12-18 VITALS
HEART RATE: 99 BPM | SYSTOLIC BLOOD PRESSURE: 122 MMHG | HEIGHT: 63 IN | BODY MASS INDEX: 51.91 KG/M2 | DIASTOLIC BLOOD PRESSURE: 84 MMHG | OXYGEN SATURATION: 100 % | WEIGHT: 293 LBS | TEMPERATURE: 98 F

## 2024-12-18 DIAGNOSIS — Z80.0 FH: COLON CANCER IN FIRST DEGREE RELATIVE <60 YEARS OLD: ICD-10-CM

## 2024-12-18 DIAGNOSIS — R60.0 EDEMA OF LEFT LOWER EXTREMITY: Primary | ICD-10-CM

## 2024-12-18 DIAGNOSIS — R01.1 SYSTOLIC MURMUR: ICD-10-CM

## 2024-12-18 DIAGNOSIS — Z12.11 COLON CANCER SCREENING: ICD-10-CM

## 2024-12-18 PROCEDURE — 3044F HG A1C LEVEL LT 7.0%: CPT | Mod: CPTII,S$GLB,,

## 2024-12-18 PROCEDURE — 1159F MED LIST DOCD IN RCRD: CPT | Mod: CPTII,S$GLB,,

## 2024-12-18 PROCEDURE — 3074F SYST BP LT 130 MM HG: CPT | Mod: CPTII,S$GLB,,

## 2024-12-18 PROCEDURE — 1160F RVW MEDS BY RX/DR IN RCRD: CPT | Mod: CPTII,S$GLB,,

## 2024-12-18 PROCEDURE — 3079F DIAST BP 80-89 MM HG: CPT | Mod: CPTII,S$GLB,,

## 2024-12-18 PROCEDURE — 99214 OFFICE O/P EST MOD 30 MIN: CPT | Mod: S$GLB,,,

## 2024-12-18 PROCEDURE — 3008F BODY MASS INDEX DOCD: CPT | Mod: CPTII,S$GLB,,

## 2024-12-18 PROCEDURE — 99999 PR PBB SHADOW E&M-EST. PATIENT-LVL V: CPT | Mod: PBBFAC,,,

## 2024-12-18 NOTE — PROGRESS NOTES
[facility]     Subjective:       Patient ID:  [id number]     Chief Complaint: Edema    Yadira Thornton is a 44 y.o. female with a past medical history significant for HTN, NIRU, obesity, trigeminal neuralgia, and uterine leiomyoma who presents to the clinic for follow up on medication.     Patient states that she did not receive Zepbound and is unsure if medication was ever approved by insurance.     Additionally, patient reports of left lower leg swelling x a couple of weeks. She denies any trauma, recent travel, or surgeries. She does report pain in the left knee, however is a chronic condition. She denies any symptoms like this in the past. She states that her whole ankle and foot swells even when she is sitting for prolonged periods of time. She does complain of right lower leg swelling, however feels that left leg is worse. She has compression stockings, but believes she may need a new pair.     Discussed care gaps with patient. She is due for colon cancer screening. She endorses that her mother was diagnosed with cancer at the age of 20. She has not had any genetic testing or cancer screening.     Of note, patient has systolic murmur. Patient has not been followed by cardiology. Multiple referrals have been placed, however patient has not shown to the appointments.     Past Medical History:   Diagnosis Date    Chronic knee pain     Hypertension     Obesity       Review of patient's allergies indicates:   Allergen Reactions    Sulfa (sulfonamide antibiotics) Swelling    Carbamazepine Rash    Hydrochlorothiazide Rash    Lisinopril Rash       Lab Results   Component Value Date    WBC 5.55 10/23/2024    HGB 11.9 (L) 10/23/2024    HCT 39.0 10/23/2024     10/23/2024    CHOL 103 (L) 06/12/2024    TRIG 55 06/12/2024    HDL 40 06/12/2024    ALT 12 06/20/2024    AST 19 06/20/2024     06/20/2024    K 3.6 06/20/2024     06/20/2024    CREATININE 0.9 06/20/2024    BUN 13 06/20/2024    CO2 27  06/20/2024    TSH 3.059 06/12/2024    HGBA1C 5.6 06/12/2024       Review of Systems   Respiratory:  Negative for shortness of breath.    Cardiovascular:  Positive for leg swelling. Negative for chest pain.   Gastrointestinal:  Negative for abdominal pain, nausea and vomiting.   Musculoskeletal:  Positive for arthralgias and joint swelling.         ECHO result on 6/13/2024  Findings   --------   Left Ventricle   The estimated LV ejection fraction is 60 %. LV chamber size is normal.   There is moderate concentric LV hypertrophy. LV systolic function is   normal. There are no wall motion abnormalities observed. Normal left   atrial pressure and diastolic function.     Right Ventricle   RV size is normal. The RV systolic function is normal.     Septum   There is no atrial septal defect (ASD). There is no ventricular septal   defect (VSD).     Left Atrium   LA chamber size is normal.     Right Atrium   RA chamber size is normal.     Aortic Valve   There is no aortic regurgitation. There is no aortic valve stenosis.     Mitral Valve   The mitral valve is structurally normal. There is mild mitral   regurgitation. There is no mitral stenosis.     Tricuspid Valve   Tricuspid valve is structurally normal. There is mild to moderate   tricuspid regurgitation. The IVC dilation is within normal limits with   a < 50% collapse, estimated RAP is 8 mmHg. There is no tricuspid   stenosis. Estimated RVSP systolic pressure is 42.02 mmHg. Mild   elevation of estimated RV systolic pressure.     Pulmonary Valve   The pulmonic valve structurally is normal. There is minimal pulmonic   regurgitation. There is no pulmonic stenosis.     Pericardium   The pericardium is normal. There is no pericardial effusion present.     Aorta   The size of the visualized portion of aortic root is within normal   limits.      Objective:      Physical Exam  Constitutional:       General: She is not in acute distress.     Appearance: Normal appearance. She is  not toxic-appearing.   HENT:      Head: Normocephalic and atraumatic.      Nose: Nose normal.   Cardiovascular:      Rate and Rhythm: Normal rate and regular rhythm.      Pulses: Normal pulses.      Heart sounds: Murmur heard.      Systolic murmur is present.      No friction rub.   Pulmonary:      Effort: Pulmonary effort is normal. No respiratory distress.      Breath sounds: Normal breath sounds. No wheezing.   Musculoskeletal:      Cervical back: Normal range of motion.      Right lower leg: Edema present.      Left lower leg: Edema present.      Comments:   Non-pitting edema. L>R, difficult to discern due to body habitus.     Negative tatiana's sign, no tenderness, redness and erythema appreciated around calf.    Skin:     General: Skin is warm and dry.   Neurological:      General: No focal deficit present.      Mental Status: She is alert and oriented to person, place, and time.   Psychiatric:         Mood and Affect: Mood normal.           Assessment:       1. Edema of left lower extremity    2. Colon cancer screening    3. FH: colon cancer in first degree relative <60 years old    4. Systolic murmur          Plan:       Yadira was seen today for edema.    Diagnoses and all orders for this visit:    - Assessed lower extremity edema, considered DVT due to acuity, however patient non-tender with no signs for erythema. Will obtain US to rule out, however unlikely.   - Considered venous insufficiency vs amlodipine  - Discussed possible trial of lasix or switching amlodipine  - Assessed murmur. Echocardiogram consistent with mild-moderate mitral and tricuspid regurgitation. Patient not followed by cardiology.   - Expressed importance of colon cancer screening as patient has first degree relative with colon cancer.   - Patient did not receive Zepbound. Will reach out to staff to see about PA.   - Follow up in 6 months with Dr. Walton.     Edema of left lower extremity  -     US Lower Extremity Veins Left; Future  -      Differentials include; DVT, medication side effects, venous insufficiency.     Colon cancer screening  -     Case Request Endoscopy: COLONOSCOPY    FH: colon cancer in first degree relative <60 years old  -     Case Request Endoscopy: COLONOSCOPY    Systolic murmur  -     Ambulatory referral/consult to Cardiology; Future        Follow up in about 6 months (around 6/18/2025) for with  .    Ifeoma Winslow PA-C  Family Medicine Physician Assistant     Future Appointments       Date Provider Specialty Appt Notes    1/10/2025 Nate Melvin MD Cardiology R01.1 (ICD-10-CM) - Systolic murmur             Tests to Keep You Healthy    Mammogram: Met on 8/21/2024  Colon Cancer Screening: ORDERED BUT NOT SCHEDULED  Cervical Cancer Screening: Met on 8/7/2024  Last Blood Pressure <= 139/89 (12/18/2024): Yes       I spent a total of 30 minutes on the day of the visit.This includes face to face time and non-face to face time preparing to see the patient (eg, review of tests), obtaining and/or reviewing separately obtained history, documenting clinical information in the electronic or other health record, independently interpreting results and communicating results to the patient/family/caregiver, or care coordinator.

## 2024-12-19 ENCOUNTER — TELEPHONE (OUTPATIENT)
Dept: FAMILY MEDICINE | Facility: CLINIC | Age: 44
End: 2024-12-19
Payer: COMMERCIAL

## 2024-12-19 DIAGNOSIS — E66.01 MORBID OBESITY WITH BMI OF 70 AND OVER, ADULT: Primary | ICD-10-CM

## 2024-12-19 DIAGNOSIS — I10 PRIMARY HYPERTENSION: ICD-10-CM

## 2024-12-19 NOTE — TELEPHONE ENCOUNTER
Patient called   States    pt would like know if there be a rx change after test results given yesterday, pts sts she still has fluid on her ankles and feet    Any changes needed on blood pressure medications?

## 2024-12-19 NOTE — TELEPHONE ENCOUNTER
Patient was notified of results. Patient verbalized understanding.     Per patient, okay to proceed with referral.

## 2024-12-19 NOTE — TELEPHONE ENCOUNTER
----- Message from Ifeoma Winslow PA-C sent at 12/19/2024  7:23 AM CST -----  Hi Ms. Thornton,     I reviewed your ultrasound. There is no signs of DVT at this time. I would recommend treatment as discussed in clinic including leg elevation and compression stockings. If these measures do not help, we can consider changing the amlodipine.     Also, I reached out to Dr. Mayers's staff, it looks like the prior authorization was not approved for the medication. We can always refer to bariatrics if you would like. Please let me know if would like to proceed with that. If you have any further questions please let me know.     Ifeoma Winslow PA-C

## 2024-12-19 NOTE — TELEPHONE ENCOUNTER
----- Message from Prakash sent at 12/19/2024 11:50 AM CST -----  Regarding: Refill  Type:  RX Refill Request    Who Called: pt  Refill or New Rx:new    RX Name and Strength:amLODIPine (NORVASC) 10 MG tablet  How is the patient currently taking it? (ex. 1XDay):as directed   Is this a 30 day or 90 day RX:90    Preferred Pharmacy with phone number:  [facility] [address] [place], [place] [address] - [address]  [address]  [place] [address]  Phone: [phone] Fax: [phone]        Local or Mail Order:local   Ordering Provider:banda     Would the patient rather a call back or a response via [facility]? Call back     Best Call Back Number:[phone]      Additional Information: pt would like know if there be a rx change after test results given yesterday, pts sts she still has fluid on her ankles and feet, please call to advise, Thank You.

## 2024-12-20 RX ORDER — AMLODIPINE BESYLATE 5 MG/1
5 TABLET ORAL DAILY
Qty: 90 TABLET | Refills: 0 | Status: SHIPPED | OUTPATIENT
Start: 2024-12-20

## 2024-12-20 RX ORDER — LOSARTAN POTASSIUM 25 MG/1
25 TABLET ORAL DAILY
Qty: 90 TABLET | Refills: 0 | Status: SHIPPED | OUTPATIENT
Start: 2024-12-20 | End: 2025-03-20

## 2024-12-20 NOTE — TELEPHONE ENCOUNTER
Spoke with patient    Instruction given    Follow up appt scheduled on 1/22/2025 with Ifeoma Winslow

## 2024-12-20 NOTE — TELEPHONE ENCOUNTER
Hi Ms. Thornton,     I reviewed your message. The amlodipine can cause swelling, so lets decrease the dose of amlodipine to 5mg. I will add another medication called losartan. This medication in addition to the amlodipine can help with the swelling. I want you to monitor your blood pressure to ensure it is not too high or too low. I would recommend following up in a few weeks to assess for improvement. My staff can make you a follow up appointment or do so on My Chart. Continue low sodium diet, compression stockings, and elevation of the legs in the mean time.     Ifeoma Winslow PA-C

## 2025-01-10 ENCOUNTER — OFFICE VISIT (OUTPATIENT)
Dept: CARDIOLOGY | Facility: CLINIC | Age: 45
End: 2025-01-10
Payer: COMMERCIAL

## 2025-01-10 VITALS
BODY MASS INDEX: 51.91 KG/M2 | OXYGEN SATURATION: 100 % | WEIGHT: 293 LBS | HEART RATE: 94 BPM | HEIGHT: 63 IN | SYSTOLIC BLOOD PRESSURE: 147 MMHG | DIASTOLIC BLOOD PRESSURE: 84 MMHG

## 2025-01-10 DIAGNOSIS — E66.01 MORBID OBESITY WITH BMI OF 70 AND OVER, ADULT: ICD-10-CM

## 2025-01-10 DIAGNOSIS — R60.9 EDEMA, UNSPECIFIED TYPE: Primary | ICD-10-CM

## 2025-01-10 DIAGNOSIS — I10 ESSENTIAL HYPERTENSION: ICD-10-CM

## 2025-01-10 DIAGNOSIS — R01.1 SYSTOLIC MURMUR: ICD-10-CM

## 2025-01-10 PROCEDURE — 99999 PR PBB SHADOW E&M-EST. PATIENT-LVL IV: CPT | Mod: PBBFAC,,, | Performed by: INTERNAL MEDICINE

## 2025-01-10 RX ORDER — LOSARTAN POTASSIUM 50 MG/1
50 TABLET ORAL DAILY
Qty: 30 TABLET | Refills: 5 | Status: SHIPPED | OUTPATIENT
Start: 2025-01-10 | End: 2025-07-09

## 2025-01-10 RX ORDER — AMLODIPINE BESYLATE 2.5 MG/1
2.5 TABLET ORAL DAILY
Qty: 30 TABLET | Refills: 5 | Status: SHIPPED | OUTPATIENT
Start: 2025-01-10 | End: 2025-07-09

## 2025-01-10 NOTE — PROGRESS NOTES
Subjective:    Patient ID:  Yadira Thornton is a 44 y.o. female patient here for evaluation Hypertension (Patient had swelling in her Left leg. )      History of Present Illness:     44-year-old female with past medical history of hypertension, morbid obesity referred from primary care for evaluation of edema.  Has bilateral leg edema for last few weeks.  Amlodipine dose was reduced to 5 mg and she noted some partial improvement in edema.  Denies any history of heart disease in the past.  Denies exertional anginal symptoms.  Venous duplex was negative for DVT.  Had an echo few months ago which showed normal ejection fraction mild-to-moderate TR, mild MR.         Review of patient's allergies indicates:   Allergen Reactions    Sulfa (sulfonamide antibiotics) Swelling    Carbamazepine Rash    Hydrochlorothiazide Rash    Lisinopril Rash       Past Medical History:   Diagnosis Date    Chronic knee pain     Hypertension     Obesity      Past Surgical History:   Procedure Laterality Date     SECTION      x 2     Social History     Tobacco Use    Smoking status: Never     Passive exposure: Never    Smokeless tobacco: Never   Substance Use Topics    Alcohol use: Not Currently    Drug use: Never        Review of Systems   Negative except as mentioned in HPI         Objective        Vitals:    01/10/25 1314   BP: (!) 147/84   Pulse: 94       LIPIDS - LAST 2   Lab Results   Component Value Date    CHOL 103 (L) 2024    CHOL 131 2021    HDL 40 2024    HDL 46 2021    LDLCALC 52.0 (L) 2024    LDLCALC 72.0 2021    TRIG 55 2024    TRIG 65 2021    CHOLHDL 38.8 2024    CHOLHDL 35.1 2021       CBC - LAST 2  Lab Results   Component Value Date    WBC 5.55 10/23/2024    WBC 7.45 2024    RBC 4.25 10/23/2024    RBC 4.83 2024    HGB 11.9 (L) 10/23/2024    HGB 11.2 (L) 2024    HCT 39.0 10/23/2024    HCT 38.2 2024    MCV 92 10/23/2024    MCV 79 (L)  "07/22/2024    MCH 28.0 10/23/2024    MCH 23.2 (L) 07/22/2024    MCHC 30.5 (L) 10/23/2024    MCHC 29.3 (L) 07/22/2024    RDW 13.2 10/23/2024    RDW 29.4 (H) 07/22/2024     10/23/2024     07/22/2024    MPV 11.0 10/23/2024    MPV 9.2 07/22/2024    GRAN 2.8 10/23/2024    GRAN 51.0 10/23/2024    LYMPH 2.1 10/23/2024    LYMPH 37.1 10/23/2024    MONO 0.5 10/23/2024    MONO 8.3 10/23/2024    BASO 0.03 10/23/2024    BASO 0.03 07/22/2024    NRBC 0 10/23/2024    NRBC 0 07/22/2024       CHEMISTRY & LIVER FUNCTION - LAST 2  Lab Results   Component Value Date     06/20/2024     06/12/2024    K 3.6 06/20/2024    K 4.0 06/12/2024     06/20/2024     06/12/2024    CO2 27 06/20/2024    CO2 23 06/12/2024    ANIONGAP 8 06/20/2024    ANIONGAP 11 06/12/2024    BUN 13 06/20/2024    BUN 10 06/12/2024    CREATININE 0.9 06/20/2024    CREATININE 0.8 06/12/2024     06/20/2024    GLU 73 06/12/2024    CALCIUM 9.3 06/20/2024    CALCIUM 8.7 06/12/2024    ALBUMIN 3.5 06/20/2024    ALBUMIN 3.3 (L) 06/12/2024    PROT 7.5 06/20/2024    PROT 7.3 06/12/2024    ALKPHOS 85 06/20/2024    ALKPHOS 79 06/12/2024    ALT 12 06/20/2024    ALT 11 06/12/2024    AST 19 06/20/2024    AST 19 06/12/2024    BILITOT 0.3 06/20/2024    BILITOT 0.3 06/12/2024        CARDIAC PROFILE - LAST 2  Lab Results   Component Value Date    BNP 74 06/12/2024        COAGULATION - LAST 2  No results found for: "LABPT", "INR", "APTT"    ENDOCRINE & PSA - LAST 2  Lab Results   Component Value Date    HGBA1C 5.6 06/12/2024    TSH 3.059 06/12/2024        ECHOCARDIOGRAM RESULTS  No results found for this or any previous visit.      CURRENT/PREVIOUS VISIT EKG  No results found for this or any previous visit.  No valid procedures specified.   No results found for this or any previous visit.    No valid procedures specified.          PREVIOUS STRESS TEST              PREVIOUS ANGIOGRAM        PHYSICAL EXAM    CONSTITUTIONAL:  NAD, obese  HEENT: No " pallor  NECK: no JVD  LUNGS: CTA b/l  HEART: regular rate and rhythm, S1, S2 normal, 2/6 holosystolic murmur along the left sternal border  ABDOMEN:  + obesity  EXTREMITIES:  Left more than right appears to be 1+ edema, hard to assess due to body habitus  NEURO: AAO X 3   Psych:  Normal affect    I HAVE REVIEWED :    The vital signs, nurses notes, and all the pertinent radiology and labs.        Current Outpatient Medications   Medication Instructions    amLODIPine (NORVASC) 2.5 mg, Oral, Daily    losartan (COZAAR) 50 mg, Oral, Daily    ZEPBOUND 5 mg, Subcutaneous, Every 7 days, For Weight loss: Start 2.5mg  once per week for 4 weeks, then Start 5mg weekly        ECG reviewed by me: sinus tachy 105  Assessment & Plan     44-year-old female with past medical history of hypertension, morbid obesity referred from primary care for evaluation of edema.  Has bilateral leg edema for last few weeks.  Amlodipine dose was reduced to 5 mg and she noted some partial improvement in edema.  Denies any history of heart disease in the past.  Denies exertional anginal symptoms.  Venous duplex was negative for DVT.  Had an echo few months ago which showed normal ejection fraction mild-to-moderate TR, mild MR.   Unclear if lower extremity edema is medication induced.  Obtain BNP, repeat echo  Allergic to lisinopril, hydrochlorothiazide and sulfa  Reduce amlodipine to 2.5 mg daily and increase losartan to 50 mg daily.  Repeat BMP in 1 week.  Home BP monitoring.  Previous LDL normal.  Weight loss  Follow up in 4 weeks for reassessment    Follow up in about 4 weeks (around 2/7/2025).

## 2025-01-16 ENCOUNTER — TELEPHONE (OUTPATIENT)
Dept: CARDIOLOGY | Facility: HOSPITAL | Age: 45
End: 2025-01-16

## 2025-01-28 ENCOUNTER — OFFICE VISIT (OUTPATIENT)
Dept: FAMILY MEDICINE | Facility: CLINIC | Age: 45
End: 2025-01-28
Payer: COMMERCIAL

## 2025-01-28 ENCOUNTER — LAB VISIT (OUTPATIENT)
Dept: LAB | Facility: HOSPITAL | Age: 45
End: 2025-01-28
Payer: COMMERCIAL

## 2025-01-28 VITALS
BODY MASS INDEX: 51.91 KG/M2 | HEART RATE: 100 BPM | TEMPERATURE: 98 F | SYSTOLIC BLOOD PRESSURE: 138 MMHG | DIASTOLIC BLOOD PRESSURE: 86 MMHG | HEIGHT: 63 IN | OXYGEN SATURATION: 97 % | WEIGHT: 293 LBS

## 2025-01-28 DIAGNOSIS — N92.6 ABNORMAL MENSTRUAL CYCLE: ICD-10-CM

## 2025-01-28 DIAGNOSIS — E66.01 MORBID OBESITY WITH BMI OF 70 AND OVER, ADULT: ICD-10-CM

## 2025-01-28 DIAGNOSIS — M25.562 CHRONIC PAIN OF LEFT KNEE: ICD-10-CM

## 2025-01-28 DIAGNOSIS — G89.29 CHRONIC PAIN OF LEFT KNEE: ICD-10-CM

## 2025-01-28 DIAGNOSIS — N92.6 ABNORMAL MENSTRUAL CYCLE: Primary | ICD-10-CM

## 2025-01-28 DIAGNOSIS — I10 PRIMARY HYPERTENSION: ICD-10-CM

## 2025-01-28 LAB
ALBUMIN SERPL BCP-MCNC: 3.3 G/DL (ref 3.5–5.2)
ALP SERPL-CCNC: 71 U/L (ref 40–150)
ALT SERPL W/O P-5'-P-CCNC: 10 U/L (ref 10–44)
ANION GAP SERPL CALC-SCNC: 12 MMOL/L (ref 8–16)
AST SERPL-CCNC: 16 U/L (ref 10–40)
BASOPHILS # BLD AUTO: 0.04 K/UL (ref 0–0.2)
BASOPHILS NFR BLD: 0.7 % (ref 0–1.9)
BILIRUB SERPL-MCNC: 0.2 MG/DL (ref 0.1–1)
BUN SERPL-MCNC: 14 MG/DL (ref 6–20)
CALCIUM SERPL-MCNC: 9.2 MG/DL (ref 8.7–10.5)
CHLORIDE SERPL-SCNC: 108 MMOL/L (ref 95–110)
CO2 SERPL-SCNC: 25 MMOL/L (ref 23–29)
CREAT SERPL-MCNC: 0.8 MG/DL (ref 0.5–1.4)
DIFFERENTIAL METHOD BLD: ABNORMAL
EOSINOPHIL # BLD AUTO: 0.2 K/UL (ref 0–0.5)
EOSINOPHIL NFR BLD: 3 % (ref 0–8)
ERYTHROCYTE [DISTWIDTH] IN BLOOD BY AUTOMATED COUNT: 12.5 % (ref 11.5–14.5)
EST. GFR  (NO RACE VARIABLE): >60 ML/MIN/1.73 M^2
FERRITIN SERPL-MCNC: 27 NG/ML (ref 20–300)
GLUCOSE SERPL-MCNC: 112 MG/DL (ref 70–110)
HCT VFR BLD AUTO: 36.6 % (ref 37–48.5)
HGB BLD-MCNC: 11.1 G/DL (ref 12–16)
IMM GRANULOCYTES # BLD AUTO: 0.01 K/UL (ref 0–0.04)
IMM GRANULOCYTES NFR BLD AUTO: 0.2 % (ref 0–0.5)
IRON SERPL-MCNC: 36 UG/DL (ref 30–160)
LYMPHOCYTES # BLD AUTO: 2 K/UL (ref 1–4.8)
LYMPHOCYTES NFR BLD: 32.7 % (ref 18–48)
MCH RBC QN AUTO: 27.6 PG (ref 27–31)
MCHC RBC AUTO-ENTMCNC: 30.3 G/DL (ref 32–36)
MCV RBC AUTO: 91 FL (ref 82–98)
MONOCYTES # BLD AUTO: 0.5 K/UL (ref 0.3–1)
MONOCYTES NFR BLD: 7.6 % (ref 4–15)
NEUTROPHILS # BLD AUTO: 3.4 K/UL (ref 1.8–7.7)
NEUTROPHILS NFR BLD: 55.8 % (ref 38–73)
NRBC BLD-RTO: 0 /100 WBC
PLATELET # BLD AUTO: 302 K/UL (ref 150–450)
PMV BLD AUTO: 10.5 FL (ref 9.2–12.9)
POTASSIUM SERPL-SCNC: 3.7 MMOL/L (ref 3.5–5.1)
PROT SERPL-MCNC: 7.4 G/DL (ref 6–8.4)
RBC # BLD AUTO: 4.02 M/UL (ref 4–5.4)
SATURATED IRON: 9 % (ref 20–50)
SODIUM SERPL-SCNC: 145 MMOL/L (ref 136–145)
TOTAL IRON BINDING CAPACITY: 423 UG/DL (ref 250–450)
TRANSFERRIN SERPL-MCNC: 286 MG/DL (ref 200–375)
TSH SERPL DL<=0.005 MIU/L-ACNC: 2.58 UIU/ML (ref 0.4–4)
WBC # BLD AUTO: 6.06 K/UL (ref 3.9–12.7)

## 2025-01-28 PROCEDURE — 1160F RVW MEDS BY RX/DR IN RCRD: CPT | Mod: CPTII,S$GLB,,

## 2025-01-28 PROCEDURE — 84443 ASSAY THYROID STIM HORMONE: CPT

## 2025-01-28 PROCEDURE — 99214 OFFICE O/P EST MOD 30 MIN: CPT | Mod: S$GLB,,,

## 2025-01-28 PROCEDURE — 3008F BODY MASS INDEX DOCD: CPT | Mod: CPTII,S$GLB,,

## 2025-01-28 PROCEDURE — 85025 COMPLETE CBC W/AUTO DIFF WBC: CPT

## 2025-01-28 PROCEDURE — 3075F SYST BP GE 130 - 139MM HG: CPT | Mod: CPTII,S$GLB,,

## 2025-01-28 PROCEDURE — 80053 COMPREHEN METABOLIC PANEL: CPT

## 2025-01-28 PROCEDURE — 3079F DIAST BP 80-89 MM HG: CPT | Mod: CPTII,S$GLB,,

## 2025-01-28 PROCEDURE — 82728 ASSAY OF FERRITIN: CPT

## 2025-01-28 PROCEDURE — 4010F ACE/ARB THERAPY RXD/TAKEN: CPT | Mod: CPTII,S$GLB,,

## 2025-01-28 PROCEDURE — 1159F MED LIST DOCD IN RCRD: CPT | Mod: CPTII,S$GLB,,

## 2025-01-28 PROCEDURE — 99999 PR PBB SHADOW E&M-EST. PATIENT-LVL IV: CPT | Mod: PBBFAC,,,

## 2025-01-28 PROCEDURE — 36415 COLL VENOUS BLD VENIPUNCTURE: CPT | Mod: PO

## 2025-01-28 PROCEDURE — 83540 ASSAY OF IRON: CPT

## 2025-01-28 RX ORDER — NAPROXEN 500 MG/1
500 TABLET ORAL 2 TIMES DAILY
Qty: 60 TABLET | Refills: 0 | Status: SHIPPED | OUTPATIENT
Start: 2025-01-28

## 2025-01-28 RX ORDER — MELOXICAM 7.5 MG/1
7.5 TABLET ORAL DAILY
COMMUNITY
Start: 2025-01-15 | End: 2025-01-28

## 2025-01-28 RX ORDER — TIRZEPATIDE 5 MG/.5ML
5 INJECTION, SOLUTION SUBCUTANEOUS
Qty: 2 ML | Refills: 0 | Status: SHIPPED | OUTPATIENT
Start: 2025-02-28 | End: 2025-03-30

## 2025-01-28 RX ORDER — TIRZEPATIDE 2.5 MG/.5ML
2.5 INJECTION, SOLUTION SUBCUTANEOUS
Qty: 2 ML | Refills: 0 | Status: SHIPPED | OUTPATIENT
Start: 2025-01-28 | End: 2025-02-27

## 2025-01-28 NOTE — PROGRESS NOTES
[facility]     Subjective:       Patient ID:  [id number]     Chief Complaint: Hypertension    Yadira Thornton is a 44 y.o. female with a past medical history significant for HTN, NIRU, obesity, trigeminal neuralgia, and uterine leiomyoma who presents to the clinic for follow up.     Of note, patient seen in clinic on 12/19/2024 with complaints of edema. DVT US negative at this time. Amlodipine was decreased to 2.5mg and losartan increased to 50mg. Blood pressure has been stable with current medication regimen. She states that swelling in the ankle improved, however still endorses swelling in the left knee. However, she is currently being followed by orthopedics for left knee pain. She states that she received a steroid injection, but symptoms were not improved. She has been taking mobic without significant improvement. She states that naproxen worked better for her in the past.     She additionally complains of menstrual irregularities. She states that she has been bleeding for 3 weeks straight. She reports normal menstrual cycles in the past which occurred every month and lasted 7 days. She reports heavy bleeding in the beginning of her cycles, however would lighten towards the end of menses. She denies any chance of being currently pregnant. She states that she is having to change tampon/pads every 4 hours. She would like to obtain blood work to assess iron stores.     She has no acute complaints at this time. Discussed starting mounjaro, patient amendable, however discussed that insurance may not cover medication. Patient denies history of medullary thyroid cancer.     Past Medical History:   Diagnosis Date    Chronic knee pain     Hypertension     Obesity       Review of patient's allergies indicates:   Allergen Reactions    Sulfa (sulfonamide antibiotics) Swelling    Carbamazepine Rash    Hydrochlorothiazide Rash    Lisinopril Rash       Lab Results   Component Value Date    WBC 5.55 10/23/2024    HGB  11.9 (L) 10/23/2024    HCT 39.0 10/23/2024     10/23/2024    CHOL 103 (L) 06/12/2024    TRIG 55 06/12/2024    HDL 40 06/12/2024    ALT 12 06/20/2024    AST 19 06/20/2024     06/20/2024    K 3.6 06/20/2024     06/20/2024    CREATININE 0.9 06/20/2024    BUN 13 06/20/2024    CO2 27 06/20/2024    TSH 3.059 06/12/2024    HGBA1C 5.6 06/12/2024       Review of Systems      Objective:      Physical Exam  Constitutional:       General: She is not in acute distress.     Appearance: Normal appearance. She is not toxic-appearing.   HENT:      Head: Normocephalic and atraumatic.      Nose: Nose normal.   Cardiovascular:      Rate and Rhythm: Normal rate and regular rhythm.      Pulses: Normal pulses.      Heart sounds: Murmur heard.      Systolic murmur is present.      No friction rub.   Pulmonary:      Effort: Pulmonary effort is normal. No respiratory distress.      Breath sounds: Normal breath sounds. No wheezing.   Musculoskeletal:      Cervical back: Normal range of motion.   Skin:     General: Skin is warm and dry.   Neurological:      General: No focal deficit present.      Mental Status: She is alert and oriented to person, place, and time.   Psychiatric:         Mood and Affect: Mood normal.           Assessment:       1. Abnormal menstrual cycle    2. Chronic pain of left knee    3. Morbid obesity with BMI of 70 and over, adult    4. Primary hypertension          Plan:       Yadira was seen today for hypertension.    Diagnoses and all orders for this visit:    - Evaluated blood pressure. Stable at this time.   - Assessed complaints of menstrual irregularities. Will refer to OBGYN. Will obtain to labs to assess for acute blood loss or possible thyroid etiology.   - Will prescribe naproxen for knee pain as patient had better success with this medication. Discussed weight loss being beneficially to knee pain. Patient has not followed with bariatrics. Will try prescribing mounjaro for weight loss. PA  pending. Discussed side effects, patient expressed understanding.   - Patient to follow up in four weeks if mounjaro is started.    Abnormal menstrual cycle  -     Comprehensive Metabolic Panel; Future  -     CBC Auto Differential; Future  -     IRON AND TIBC; Future  -     FERRITIN; Future  -     TSH; Future  -     Ambulatory referral/consult to Obstetrics / Gynecology; Future    Chronic pain of left knee  -     naproxen (NAPROSYN) 500 MG tablet; Take 1 tablet (500 mg total) by mouth 2 (two) times daily.    Morbid obesity with BMI of 70 and over, adult  -     tirzepatide (MOUNJARO) 2.5 mg/0.5 mL PnIj; Inject 2.5 mg into the skin every 7 days.  -     tirzepatide (MOUNJARO) 5 mg/0.5 mL PnIj; Inject 5 mg into the skin every 7 days.    Primary hypertension         -    Stable. Continue medication as prescribed.             Follow up in about 4 weeks (around 2/25/2025).    Ifeoma Winslow PA-C  Family Medicine Physician Assistant     Future Appointments       Date Provider Specialty Appt Notes    1/31/2025 Oral Walter MD Obstetrics and Gynecology Abnormal menstrual cycle [N92.6]    2/20/2025 Nate Melvin MD Cardiology f/u echo    2/25/2025 Ifeoma Winslow PA-C Family Medicine 4 weeks    5/21/2025 Natacha Walton., DO Family Medicine routine             Tests to Keep You Healthy    Mammogram: Met on 8/21/2024  Colon Cancer Screening: DUE  Cervical Cancer Screening: Met on 8/7/2024  Last Blood Pressure <= 139/89 (1/28/2025): NO       I spent a total of 30 minutes on the day of the visit.This includes face to face time and non-face to face time preparing to see the patient (eg, review of tests), obtaining and/or reviewing separately obtained history, documenting clinical information in the electronic or other health record, independently interpreting results and communicating results to the patient/family/caregiver, or care coordinator.

## 2025-01-29 ENCOUNTER — TELEPHONE (OUTPATIENT)
Dept: FAMILY MEDICINE | Facility: CLINIC | Age: 45
End: 2025-01-29
Payer: COMMERCIAL

## 2025-01-29 NOTE — TELEPHONE ENCOUNTER
----- Message from Ifeoma Winslow PA-C sent at 1/29/2025  1:12 PM CST -----  Hi Ms. Thornton,     I have reviewed your lab results, and your blood count is consistent with your baseline. Your iron stores are at the lower end of the normal range. I recommend taking Vitron C, as it can help improve your iron stores. This can be found over the counter. Please note that iron supplementation can sometimes cause constipation and darker stools, so it would be beneficial to increase your water intake while taking it and using stool softeners as needed. Your thyroid levels are within normal limits at this time. Please let me know if you have any further questions.    Ifeoma Winslow PA-C

## 2025-01-31 ENCOUNTER — OFFICE VISIT (OUTPATIENT)
Dept: OBSTETRICS AND GYNECOLOGY | Facility: CLINIC | Age: 45
End: 2025-01-31
Payer: COMMERCIAL

## 2025-01-31 VITALS — BODY MASS INDEX: 79.39 KG/M2 | DIASTOLIC BLOOD PRESSURE: 80 MMHG | SYSTOLIC BLOOD PRESSURE: 132 MMHG | WEIGHT: 293 LBS

## 2025-01-31 DIAGNOSIS — N93.9 ABNORMAL UTERINE BLEEDING (AUB): Primary | ICD-10-CM

## 2025-01-31 PROCEDURE — 3075F SYST BP GE 130 - 139MM HG: CPT | Mod: CPTII,S$GLB,, | Performed by: OBSTETRICS & GYNECOLOGY

## 2025-01-31 PROCEDURE — 3008F BODY MASS INDEX DOCD: CPT | Mod: CPTII,S$GLB,, | Performed by: OBSTETRICS & GYNECOLOGY

## 2025-01-31 PROCEDURE — 4010F ACE/ARB THERAPY RXD/TAKEN: CPT | Mod: CPTII,S$GLB,, | Performed by: OBSTETRICS & GYNECOLOGY

## 2025-01-31 PROCEDURE — 99204 OFFICE O/P NEW MOD 45 MIN: CPT | Mod: S$GLB,,, | Performed by: OBSTETRICS & GYNECOLOGY

## 2025-01-31 PROCEDURE — 3079F DIAST BP 80-89 MM HG: CPT | Mod: CPTII,S$GLB,, | Performed by: OBSTETRICS & GYNECOLOGY

## 2025-01-31 PROCEDURE — 99999 PR PBB SHADOW E&M-EST. PATIENT-LVL III: CPT | Mod: PBBFAC,,, | Performed by: OBSTETRICS & GYNECOLOGY

## 2025-01-31 PROCEDURE — 1159F MED LIST DOCD IN RCRD: CPT | Mod: CPTII,S$GLB,, | Performed by: OBSTETRICS & GYNECOLOGY

## 2025-01-31 RX ORDER — MEDROXYPROGESTERONE ACETATE 5 MG/1
5 TABLET ORAL 2 TIMES DAILY
Qty: 14 TABLET | Refills: 0 | Status: SHIPPED | OUTPATIENT
Start: 2025-01-31 | End: 2025-02-07

## 2025-01-31 NOTE — PROGRESS NOTES
[facility] Note      Subjective:   Chief Complaint:  Vaginal Bleeding (Irregular cycle )      Date: 2025     Patient ID: Yadira Thornton is a  44 y.o. female.    Contraception: None     Patient's last menstrual period was 2025.    The patient presents today due to the following:    Gynecologic issues:  The patient reports a previous history of abnormal uterine bleeding leading to anemia and requiring transfusion.  She reports no treatment following this previous episode     Most recently she reports regular cycles until approximately three weeks ago.  Since that time she has been having daily episodes of bleeding including spotting and clot passage.  No pelvic pain.    Pap smear history:  No history of abnormal Pap smears.  Last Pap smear: 2024    Personal or family history of bleeding or blood clotting disorders:  Negative     Family history:  Breast cancer:  Negative   Colon cancer: POSITIVE - Mother  Gyn related cancer:  Negative    GYN & OB History  LMP: Patient's last menstrual period was 2025.     Age of Menarche:10  Age at first pregnancy:    Age at first live birth:18  Number of months breastfeeding:    Age at Menopause:    Comments:     OB History          2    Para   2    Term   2            AB        Living   2         SAB        IAB        Ectopic        Multiple        Live Births               Obstetric Comments    x 2               Allergies:   Review of patient's allergies indicates:   Allergen Reactions    Sulfa (sulfonamide antibiotics) Swelling    Carbamazepine Rash    Hydrochlorothiazide Rash    Lisinopril Rash       Past Medical History:   Diagnosis Date    Chronic pain of left knee 2021    Hypertension     Obesity     BMI > 79       Past Surgical History:   Procedure Laterality Date     SECTION      x 2       Medications    Current Outpatient Medications:     amLODIPine (NORVASC) 2.5 MG tablet, Take 1  tablet (2.5 mg total) by mouth once daily., Disp: 30 tablet, Rfl: 5    losartan (COZAAR) 50 MG tablet, Take 1 tablet (50 mg total) by mouth once daily., Disp: 30 tablet, Rfl: 5    naproxen (NAPROSYN) 500 MG tablet, Take 1 tablet (500 mg total) by mouth 2 (two) times daily., Disp: 60 tablet, Rfl: 0    tirzepatide (MOUNJARO) 2.5 mg/0.5 mL PnIj, Inject 2.5 mg into the skin every 7 days., Disp: 2 mL, Rfl: 0    [START ON 2/28/2025] tirzepatide (MOUNJARO) 5 mg/0.5 mL PnIj, Inject 5 mg into the skin every 7 days., Disp: 2 mL, Rfl: 0    medroxyPROGESTERone (PROVERA) 5 MG tablet, Take 1 tablet (5 mg total) by mouth 2 (two) times a day. for 7 days, Disp: 14 tablet, Rfl: 0     Social History     Tobacco Use    Smoking status: Never     Passive exposure: Never    Smokeless tobacco: Never   Substance Use Topics    Alcohol use: Not Currently    Drug use: Never       Family History   Problem Relation Name Age of Onset    Colon cancer Mother      No Known Problems Father      Hypertension Maternal Grandmother      Diabetes Maternal Grandmother         Review of Systems (at today's evaluation)  Review of Systems   Constitutional:  Negative for fever and unexpected weight change.   Respiratory:  Negative for cough and shortness of breath.    Cardiovascular:  Negative for chest pain and palpitations.   Gastrointestinal:  Negative for constipation, diarrhea, nausea and vomiting.   Genitourinary:  Negative for dysuria, frequency, hematuria and urgency.        Gyn as per HPI   Neurological:  Negative for headaches.        Objective:      Vitals:    01/31/25 1550   BP: 132/80     Physical Exam:   Constitutional: She appears well-developed and well-nourished. No distress.    HENT:   Head: Normocephalic.     Neck: No thyroid mass present.    Cardiovascular:  Normal rate.             Pulmonary/Chest: Effort normal. No respiratory distress.        Abdominal: Soft. There is no abdominal tenderness.   Obese     Genitourinary:    Inguinal canal,  vagina, uterus, right adnexa and left adnexa normal.      Pelvic exam was performed with patient supine.   The external female genitalia was normal.   No external genitalia lesions identified,Cervix is normal. Right adnexum displays no mass and no tenderness. Left adnexum displays no mass and no tenderness. No tenderness or bleeding in the vagina. Uterus is not tender. Normal urethral meatus.Urethra findings: no tendernessBladder findings: no bladder tenderness   Genitourinary Comments: Suboptimal abdominal pelvic exam secondary to patient BMI.      A chaperone (female medical assistant) was present throughout the pelvic exam.             Musculoskeletal: Normal range of motion.      Right lower leg: No edema.      Left lower leg: No edema.      Lymphadenopathy: No inguinal adenopathy noted on the right or left side.    Neurological: She is alert.    Skin: Skin is warm and dry.    Psychiatric: She has a normal mood and affect. Mood normal.         Assessment:        1. Abnormal uterine bleeding (AUB)    2. BMI 70 and over, adult        Plan:      Abnormal uterine bleeding (AUB)  -     Ambulatory referral/consult to Obstetrics / Gynecology  -     US Pelvis Comp with Transvag NON-OB (xpd; Future; Expected date: 01/31/2025  -     Ferritin; Future; Expected date: 01/31/2025  -     CBC Auto Differential; Future; Expected date: 01/31/2025  -     Luteinizing Hormone; Future; Expected date: 01/31/2025  -     Follicle Stimulating Hormone; Future; Expected date: 01/31/2025  -     Estradiol; Future; Expected date: 01/31/2025  -     Thyroid Peroxidase Antibody; Future; Expected date: 01/31/2025  -     TSH; Future; Expected date: 01/31/2025  -     medroxyPROGESTERone (PROVERA) 5 MG tablet; Take 1 tablet (5 mg total) by mouth 2 (two) times a day. for 7 days  Dispense: 14 tablet; Refill: 0    BMI 70 and over, adult       Follow up for ASAP after recommended testing obtained, F/U on today's evaluation.     The above was reviewed  discussed with the patient.  We discussed the various potential causes associated with abnormal uterine bleeding.    We discussed the role in both high and low BMI eyes with abnormal bleeding.      At this time will proceed as follows:   The patient will initially be treated with oral progesterone 5 mg twice daily for seven days.    Pelvic ultrasound has been ordered   Hormonal and anemia blood work as above has been ordered   We will base further evaluation treatment of the patient's response to medical intervention and findings on laboratory and radiology evaluation.    The patient's questions were answered, and she is in agreement with the current plan.     Oral Walter MD  Department OBGYN  [facility]

## 2025-02-19 ENCOUNTER — LAB VISIT (OUTPATIENT)
Dept: LAB | Facility: HOSPITAL | Age: 45
End: 2025-02-19
Attending: OBSTETRICS & GYNECOLOGY
Payer: COMMERCIAL

## 2025-02-19 ENCOUNTER — HOSPITAL ENCOUNTER (OUTPATIENT)
Dept: OBSTETRICS AND GYNECOLOGY | Facility: CLINIC | Age: 45
Discharge: HOME | End: 2025-02-19
Attending: OBSTETRICS & GYNECOLOGY
Payer: COMMERCIAL

## 2025-02-19 ENCOUNTER — RESULTS FOLLOW-UP (OUTPATIENT)
Dept: OBSTETRICS AND GYNECOLOGY | Facility: CLINIC | Age: 45
End: 2025-02-19

## 2025-02-19 DIAGNOSIS — N93.9 ABNORMAL UTERINE BLEEDING (AUB): ICD-10-CM

## 2025-02-19 LAB
BASOPHILS # BLD AUTO: 0.04 K/UL (ref 0–0.2)
BASOPHILS NFR BLD: 0.7 % (ref 0–1.9)
DIFFERENTIAL METHOD BLD: ABNORMAL
EOSINOPHIL # BLD AUTO: 0.2 K/UL (ref 0–0.5)
EOSINOPHIL NFR BLD: 4 % (ref 0–8)
ERYTHROCYTE [DISTWIDTH] IN BLOOD BY AUTOMATED COUNT: 12.9 % (ref 11.5–14.5)
ESTRADIOL SERPL-MCNC: 216 PG/ML
FERRITIN SERPL-MCNC: 26 NG/ML (ref 20–300)
FSH SERPL-ACNC: 5.61 MIU/ML
HCT VFR BLD AUTO: 37.8 % (ref 37–48.5)
HGB BLD-MCNC: 11.4 G/DL (ref 12–16)
IMM GRANULOCYTES # BLD AUTO: 0.01 K/UL (ref 0–0.04)
IMM GRANULOCYTES NFR BLD AUTO: 0.2 % (ref 0–0.5)
LH SERPL-ACNC: 10.3 MIU/ML
LYMPHOCYTES # BLD AUTO: 1.9 K/UL (ref 1–4.8)
LYMPHOCYTES NFR BLD: 30.7 % (ref 18–48)
MCH RBC QN AUTO: 27.6 PG (ref 27–31)
MCHC RBC AUTO-ENTMCNC: 30.2 G/DL (ref 32–36)
MCV RBC AUTO: 92 FL (ref 82–98)
MONOCYTES # BLD AUTO: 0.6 K/UL (ref 0.3–1)
MONOCYTES NFR BLD: 9.3 % (ref 4–15)
NEUTROPHILS # BLD AUTO: 3.3 K/UL (ref 1.8–7.7)
NEUTROPHILS NFR BLD: 55.1 % (ref 38–73)
NRBC BLD-RTO: 0 /100 WBC
PLATELET # BLD AUTO: 297 K/UL (ref 150–450)
PMV BLD AUTO: 10.9 FL (ref 9.2–12.9)
RBC # BLD AUTO: 4.13 M/UL (ref 4–5.4)
TSH SERPL DL<=0.005 MIU/L-ACNC: 3.53 UIU/ML (ref 0.4–4)
WBC # BLD AUTO: 6.03 K/UL (ref 3.9–12.7)

## 2025-02-19 PROCEDURE — 86376 MICROSOMAL ANTIBODY EACH: CPT | Performed by: OBSTETRICS & GYNECOLOGY

## 2025-02-19 PROCEDURE — 84443 ASSAY THYROID STIM HORMONE: CPT | Performed by: OBSTETRICS & GYNECOLOGY

## 2025-02-19 PROCEDURE — 83001 ASSAY OF GONADOTROPIN (FSH): CPT | Performed by: OBSTETRICS & GYNECOLOGY

## 2025-02-19 PROCEDURE — 36415 COLL VENOUS BLD VENIPUNCTURE: CPT | Mod: PO | Performed by: OBSTETRICS & GYNECOLOGY

## 2025-02-19 PROCEDURE — 82728 ASSAY OF FERRITIN: CPT | Performed by: OBSTETRICS & GYNECOLOGY

## 2025-02-19 PROCEDURE — 82670 ASSAY OF TOTAL ESTRADIOL: CPT | Performed by: OBSTETRICS & GYNECOLOGY

## 2025-02-19 PROCEDURE — 85025 COMPLETE CBC W/AUTO DIFF WBC: CPT | Performed by: OBSTETRICS & GYNECOLOGY

## 2025-02-19 PROCEDURE — 83002 ASSAY OF GONADOTROPIN (LH): CPT | Performed by: OBSTETRICS & GYNECOLOGY

## 2025-02-20 LAB — THYROPEROXIDASE IGG SERPL-ACNC: <6 IU/ML

## 2025-02-25 ENCOUNTER — TELEPHONE (OUTPATIENT)
Dept: FAMILY MEDICINE | Facility: CLINIC | Age: 45
End: 2025-02-25
Payer: COMMERCIAL

## 2025-02-27 ENCOUNTER — OFFICE VISIT (OUTPATIENT)
Dept: OBSTETRICS AND GYNECOLOGY | Facility: CLINIC | Age: 45
End: 2025-02-27
Payer: COMMERCIAL

## 2025-02-27 VITALS
WEIGHT: 293 LBS | BODY MASS INDEX: 51.91 KG/M2 | SYSTOLIC BLOOD PRESSURE: 152 MMHG | HEIGHT: 63 IN | DIASTOLIC BLOOD PRESSURE: 88 MMHG

## 2025-02-27 DIAGNOSIS — N93.9 ABNORMAL UTERINE BLEEDING (AUB): Primary | ICD-10-CM

## 2025-02-27 PROCEDURE — 3079F DIAST BP 80-89 MM HG: CPT | Mod: CPTII,S$GLB,, | Performed by: OBSTETRICS & GYNECOLOGY

## 2025-02-27 PROCEDURE — 99214 OFFICE O/P EST MOD 30 MIN: CPT | Mod: S$GLB,,, | Performed by: OBSTETRICS & GYNECOLOGY

## 2025-02-27 PROCEDURE — 3008F BODY MASS INDEX DOCD: CPT | Mod: CPTII,S$GLB,, | Performed by: OBSTETRICS & GYNECOLOGY

## 2025-02-27 PROCEDURE — 4010F ACE/ARB THERAPY RXD/TAKEN: CPT | Mod: CPTII,S$GLB,, | Performed by: OBSTETRICS & GYNECOLOGY

## 2025-02-27 PROCEDURE — 3077F SYST BP >= 140 MM HG: CPT | Mod: CPTII,S$GLB,, | Performed by: OBSTETRICS & GYNECOLOGY

## 2025-02-27 PROCEDURE — 99999 PR PBB SHADOW E&M-EST. PATIENT-LVL III: CPT | Mod: PBBFAC,,, | Performed by: OBSTETRICS & GYNECOLOGY

## 2025-02-27 PROCEDURE — 1159F MED LIST DOCD IN RCRD: CPT | Mod: CPTII,S$GLB,, | Performed by: OBSTETRICS & GYNECOLOGY

## 2025-02-27 RX ORDER — NORETHINDRONE 0.35 MG/1
1 TABLET ORAL DAILY
Qty: 90 TABLET | Refills: 3 | Status: SHIPPED | OUTPATIENT
Start: 2025-02-27 | End: 2026-02-27

## 2025-02-27 NOTE — PROGRESS NOTES
[facility] Note      Subjective:   Chief Complaint:  Follow-up (Lab and u/s follow up)      Date: 2025     Patient ID: Yadira Thornton is a  45 y.o. female.    Contraception: None     Patient's last menstrual period was 2025.    The patient presents today due to the following:    Gynecologic issues:  The patient reports a previous history of abnormal uterine bleeding leading to anemia and requiring transfusion.  She reports no treatment following this previous episode     Most recently she reports regular cycles until approximately three weeks ago.  Since that time she has been having daily episodes of bleeding including spotting and clot passage.  No pelvic pain.    Pap smear history:  No history of abnormal Pap smears.  Last Pap smear: 2024    Personal or family history of bleeding or blood clotting disorders:  Negative     Family history:  Breast cancer:  Negative   Colon cancer: POSITIVE - Mother  Gyn related cancer:  Negative    Date: 2025    The patient presents today for follow-up.  She was last seen as above.  In light of her abnormal uterine bleeding lab work / radiology testing was ordered.    She presents to discuss the results of her laboratory evaluation / radiologic evaluation as well as further potential evaluation and treatment options.    GYN & OB History  LMP: Patient's last menstrual period was 2025.     Age of Menarche:10  Age at first pregnancy:    Age at first live birth:18  Number of months breastfeeding:    Age at Menopause:    Comments:     OB History          2    Para   2    Term   2            AB        Living   2         SAB        IAB        Ectopic        Multiple        Live Births               Obstetric Comments    x 2               Allergies:   Review of patient's allergies indicates:   Allergen Reactions    Sulfa (sulfonamide antibiotics) Swelling    Carbamazepine Rash    Hydrochlorothiazide Rash     Lisinopril Rash       Past Medical History:   Diagnosis Date    Chronic pain of left knee 2021    Hypertension     Obesity     BMI > 79       Past Surgical History:   Procedure Laterality Date     SECTION      x 2       Medications    Current Outpatient Medications:     amLODIPine (NORVASC) 2.5 MG tablet, Take 1 tablet (2.5 mg total) by mouth once daily., Disp: 30 tablet, Rfl: 5    losartan (COZAAR) 50 MG tablet, Take 1 tablet (50 mg total) by mouth once daily., Disp: 30 tablet, Rfl: 5    naproxen (NAPROSYN) 500 MG tablet, Take 1 tablet (500 mg total) by mouth 2 (two) times daily., Disp: 60 tablet, Rfl: 0    medroxyPROGESTERone (PROVERA) 5 MG tablet, Take 1 tablet (5 mg total) by mouth 2 (two) times a day. for 7 days, Disp: 14 tablet, Rfl: 0    norethindrone (MICRONOR) 0.35 mg tablet, Take 1 tablet (0.35 mg total) by mouth once daily., Disp: 90 tablet, Rfl: 3    tirzepatide (MOUNJARO) 5 mg/0.5 mL PnIj, Inject 5 mg into the skin every 7 days. (Patient not taking: Reported on 2025), Disp: 2 mL, Rfl: 0     Social History     Tobacco Use    Smoking status: Never     Passive exposure: Never    Smokeless tobacco: Never   Substance Use Topics    Alcohol use: Not Currently    Drug use: Never       Family History   Problem Relation Name Age of Onset    Colon cancer Mother      No Known Problems Father      Hypertension Maternal Grandmother      Diabetes Maternal Grandmother         Review of Systems (at today's evaluation)  Review of Systems   Constitutional:  Negative for fever and unexpected weight change.   Respiratory:  Negative for cough and shortness of breath.    Cardiovascular:  Negative for chest pain and palpitations.   Gastrointestinal:  Negative for constipation, diarrhea, nausea and vomiting.   Genitourinary:  Negative for dysuria, frequency, hematuria and urgency.        Gyn as per HPI   Neurological:  Negative for headaches.        Objective:      Vitals:    25 1436   BP: (!) 152/88      Physical Exam:   Constitutional: She appears well-developed and well-nourished. No distress.    HENT:   Head: Normocephalic.     Neck: No thyroid mass present.    Cardiovascular:  Normal rate.             Pulmonary/Chest: Effort normal. No respiratory distress.        Abdominal: Soft. There is no abdominal tenderness.             Musculoskeletal: Normal range of motion.      Right lower leg: No edema.      Left lower leg: No edema.       Neurological: She is alert.   No gross lesions noted.    Skin: Skin is warm and dry.    Psychiatric: She has a normal mood and affect. Her speech is normal and behavior is normal. Mood normal.       Recent Laboratory Results:    Results      Collected Updated Procedure    02/19/2025 1457 02/19/2025 2137 TSH [[id number]]   Blood    Component Value Units   TSH 3.526 uIU/mL          02/19/2025 1457 02/20/2025 1336 Thyroid Peroxidase Antibody [[id number]]   Blood    Component Value Units   Thyroperoxidase Antibodies <6.0 IU/mL          02/19/2025 1457 02/19/2025 2136 Estradiol [[id number]]   Blood    Component Value Units   Estradiol 216  pg/mL          02/19/2025 1457 02/19/2025 2137 Follicle Stimulating Hormone [[id number]]   Blood    Component Value Units   Follicle Stimulating Hormone 5.61  mIU/mL          02/19/2025 1457 02/19/2025 2137 Luteinizing Hormone [[id number]]   Blood    Component Value Units   LH 10.3  mIU/mL          02/19/2025 1457 02/19/2025 2051 CBC Auto Differential [[id number]]   (Abnormal)   Blood    Component Value Units   WBC 6.03 K/uL   RBC 4.13 M/uL   Hemoglobin 11.4 Low  g/dL   Hematocrit 37.8 %   MCV 92 fL   MCH 27.6 pg   MCHC 30.2 Low  g/dL   RDW 12.9 %   Platelets 297 K/uL   MPV 10.9 fL   Immature Granulocytes 0.2 %   Gran # (ANC) 3.3 K/uL   Immature Grans (Abs) 0.01  K/uL   Lymph # 1.9 K/uL   Mono # 0.6 K/uL   Eos # 0.2 K/uL   Baso # 0.04 K/uL   nRBC 0 /100 WBC   Gran % 55.1 %   Lymph % 30.7 %   Mono % 9.3 %   Eosinophil % 4.0 %   Basophil % 0.7 %    Differential Method Automated           02/19/2025 1457 02/19/2025 2137 Ferritin [[id number]]   Blood    Component Value Units   Ferritin 26 ng/mL                Recent Radiology Results:     2/19/2025 Routine     Narrative & Impression  EXAMINATION:  US PELVIS COMP WITH TRANSVAG NON-OB (XPD)     CLINICAL HISTORY:  Abnormal uterine and vaginal bleeding, unspecified    FINDINGS:  Uterus:     Size: 13.0 x 6.6 x 7.5 cm     Masses: There are multiple uterine lesions thought to represent fibroids, the largest measures 4.5 cm.     Endometrium: Normal in this pre menopausal patient, measuring 5.9 mm.     Right ovary:     Not visualized     Left ovary:     Size: 3.3 x 2.6 x 2.6 cm     Appearance: There is a 2.6 cm cyst seen within the left ovary.     Vascular Flow: Normal.     Impression:     There are uterine lesions thought to represent fibroids, the largest measures 4.5 cm.      Assessment:        1. Abnormal uterine bleeding (AUB)    2. BMI 70 and over, adult      Plan:      Abnormal uterine bleeding (AUB)  -     norethindrone (MICRONOR) 0.35 mg tablet; Take 1 tablet (0.35 mg total) by mouth once daily.  Dispense: 90 tablet; Refill: 3    BMI 70 and over, adult      Follow up in about 3 months (around 5/27/2025) for F/U on today's evaluation, as needed / for any GYN related issues.     The above was reviewed discussed with the patient.  We discussed the results of her laboratory evaluation which noted a mild anemia only without signs of menopause.    We discussed her ultrasound results which do note a 4.5 cm myoma more than likely playing a role in her bleeding.    Conservative, medical (oral contraceptives, the transdermal patch, the contraceptive ring, progesterone based birth control such as Depo-Provera the Nexplanon and IUDs) and surgical intervention  were reviewed and discussed.  At this time the patient will initiate therapy on a 0.35 mg progesterone birth control.  The pros, cons, risks, benefits, alternatives  and indications of the medication(s) prescribed, as well as appropriate use and potential side effects were discussed.  We discussed issues and relevant risks associated with the medicine prescribed.   We will base further evaluation treatment on the patient's response to medical intervention.    The patient's questions were answered, and she is in agreement with the current plan.     Oral Walter MD  Department OBGYN [facility]

## 2025-03-19 ENCOUNTER — PATIENT MESSAGE (OUTPATIENT)
Dept: GASTROENTEROLOGY | Facility: CLINIC | Age: 45
End: 2025-03-19
Payer: COMMERCIAL

## 2025-05-12 ENCOUNTER — TELEPHONE (OUTPATIENT)
Dept: FAMILY MEDICINE | Facility: CLINIC | Age: 45
End: 2025-05-12
Payer: COMMERCIAL

## 2025-05-12 NOTE — TELEPHONE ENCOUNTER
I spoke with pt via phone in regards to appt with Dr. Walton on 05/21/2025. I advised pt that Dr. Walton is no longer with [facility] and pt will have to r/s. Pt states that she would like to do research first before establishing. States that she will call the clinic back when ready to book appt.

## 2025-05-14 ENCOUNTER — LAB VISIT (OUTPATIENT)
Dept: LAB | Facility: HOSPITAL | Age: 45
End: 2025-05-14
Payer: COMMERCIAL

## 2025-05-14 DIAGNOSIS — R60.9 EDEMA, UNSPECIFIED TYPE: ICD-10-CM

## 2025-05-14 DIAGNOSIS — E66.9 OBESITY: ICD-10-CM

## 2025-05-14 DIAGNOSIS — E55.9 VITAMIN D DEFICIENCY: ICD-10-CM

## 2025-05-14 DIAGNOSIS — D50.9 IRON DEFICIENCY ANEMIA, UNSPECIFIED IRON DEFICIENCY ANEMIA TYPE: ICD-10-CM

## 2025-05-14 DIAGNOSIS — Z13.21 SCREENING FOR MALNUTRITION: ICD-10-CM

## 2025-05-14 DIAGNOSIS — Z13.220 SCREENING FOR LIPOID DISORDERS: ICD-10-CM

## 2025-05-14 DIAGNOSIS — E66.9 OBESITY, UNSPECIFIED CLASS, UNSPECIFIED OBESITY TYPE, UNSPECIFIED WHETHER SERIOUS COMORBIDITY PRESENT: ICD-10-CM

## 2025-05-14 DIAGNOSIS — R01.1 SYSTOLIC MURMUR: ICD-10-CM

## 2025-05-14 DIAGNOSIS — E88.810 DYSMETABOLIC SYNDROME X: ICD-10-CM

## 2025-05-14 DIAGNOSIS — E34.9 ENDOCRINE PROBLEM: Primary | ICD-10-CM

## 2025-05-14 DIAGNOSIS — E03.9 HYPOTHYROID OBESITY: ICD-10-CM

## 2025-05-14 DIAGNOSIS — D50.9 MICROCYTIC ANEMIA: ICD-10-CM

## 2025-05-14 DIAGNOSIS — R53.83 FATIGUE, UNSPECIFIED TYPE: ICD-10-CM

## 2025-05-14 LAB
25(OH)D3+25(OH)D2 SERPL-MCNC: 8 NG/ML (ref 30–96)
ABSOLUTE EOSINOPHIL (OHS): 0.19 K/UL
ABSOLUTE EOSINOPHIL (OHS): 0.21 K/UL
ABSOLUTE MONOCYTE (OHS): 0.33 K/UL (ref 0.3–1)
ABSOLUTE MONOCYTE (OHS): 0.36 K/UL (ref 0.3–1)
ABSOLUTE NEUTROPHIL COUNT (OHS): 2.18 K/UL (ref 1.8–7.7)
ABSOLUTE NEUTROPHIL COUNT (OHS): 2.29 K/UL (ref 1.8–7.7)
ALBUMIN SERPL BCP-MCNC: 3.7 G/DL (ref 3.5–5.2)
ALP SERPL-CCNC: 57 UNIT/L (ref 40–150)
ALT SERPL W/O P-5'-P-CCNC: 14 UNIT/L (ref 10–44)
ANION GAP (OHS): 15 MMOL/L (ref 8–16)
AST SERPL-CCNC: 21 UNIT/L (ref 11–45)
BASOPHILS # BLD AUTO: 0.04 K/UL
BASOPHILS # BLD AUTO: 0.06 K/UL
BASOPHILS NFR BLD AUTO: 0.9 %
BASOPHILS NFR BLD AUTO: 1.3 %
BILIRUB SERPL-MCNC: 0.2 MG/DL (ref 0.1–1)
BNP SERPL-MCNC: <10 PG/ML (ref 0–99)
BUN SERPL-MCNC: 18 MG/DL (ref 6–20)
CALCIUM SERPL-MCNC: 9.3 MG/DL (ref 8.7–10.5)
CHLORIDE SERPL-SCNC: 104 MMOL/L (ref 95–110)
CHOLEST SERPL-MCNC: 140 MG/DL (ref 120–199)
CHOLEST/HDLC SERPL: 3.9 {RATIO} (ref 2–5)
CO2 SERPL-SCNC: 21 MMOL/L (ref 23–29)
CREAT SERPL-MCNC: 0.8 MG/DL (ref 0.5–1.4)
EAG (OHS): 105 MG/DL (ref 68–131)
ERYTHROCYTE [DISTWIDTH] IN BLOOD BY AUTOMATED COUNT: 12.4 % (ref 11.5–14.5)
ERYTHROCYTE [DISTWIDTH] IN BLOOD BY AUTOMATED COUNT: 12.6 % (ref 11.5–14.5)
FERRITIN SERPL-MCNC: 40.6 NG/ML (ref 20–300)
GFR SERPLBLD CREATININE-BSD FMLA CKD-EPI: >60 ML/MIN/1.73/M2
GLUCOSE SERPL-MCNC: 79 MG/DL (ref 70–110)
HBA1C MFR BLD: 5.3 % (ref 4–5.6)
HCT VFR BLD AUTO: 37.7 % (ref 37–48.5)
HCT VFR BLD AUTO: 38.8 % (ref 37–48.5)
HDLC SERPL-MCNC: 36 MG/DL (ref 40–75)
HDLC SERPL: 25.7 % (ref 20–50)
HGB BLD-MCNC: 12.1 GM/DL (ref 12–16)
HGB BLD-MCNC: 12.3 GM/DL (ref 12–16)
IMM GRANULOCYTES # BLD AUTO: 0 K/UL (ref 0–0.04)
IMM GRANULOCYTES # BLD AUTO: 0.01 K/UL (ref 0–0.04)
IMM GRANULOCYTES NFR BLD AUTO: 0 % (ref 0–0.5)
IMM GRANULOCYTES NFR BLD AUTO: 0.2 % (ref 0–0.5)
IRON SATN MFR SERPL: 10 % (ref 20–50)
IRON SERPL-MCNC: 42 UG/DL (ref 30–160)
LDLC SERPL CALC-MCNC: 87.4 MG/DL (ref 63–159)
LYMPHOCYTES # BLD AUTO: 1.7 K/UL (ref 1–4.8)
LYMPHOCYTES # BLD AUTO: 1.74 K/UL (ref 1–4.8)
MCH RBC QN AUTO: 27.3 PG (ref 27–31)
MCH RBC QN AUTO: 28.3 PG (ref 27–31)
MCHC RBC AUTO-ENTMCNC: 31.2 G/DL (ref 32–36)
MCHC RBC AUTO-ENTMCNC: 32.6 G/DL (ref 32–36)
MCV RBC AUTO: 87 FL (ref 82–98)
MCV RBC AUTO: 88 FL (ref 82–98)
NONHDLC SERPL-MCNC: 104 MG/DL
NUCLEATED RBC (/100WBC) (OHS): 0 /100 WBC
NUCLEATED RBC (/100WBC) (OHS): 0 /100 WBC
PLATELET # BLD AUTO: 281 K/UL (ref 150–450)
PLATELET # BLD AUTO: 287 K/UL (ref 150–450)
PMV BLD AUTO: 10.3 FL (ref 9.2–12.9)
PMV BLD AUTO: 10.8 FL (ref 9.2–12.9)
POTASSIUM SERPL-SCNC: 3.9 MMOL/L (ref 3.5–5.1)
PROT SERPL-MCNC: 7.6 GM/DL (ref 6–8.4)
RBC # BLD AUTO: 4.34 M/UL (ref 4–5.4)
RBC # BLD AUTO: 4.43 M/UL (ref 4–5.4)
RELATIVE EOSINOPHIL (OHS): 4.2 %
RELATIVE EOSINOPHIL (OHS): 4.6 %
RELATIVE LYMPHOCYTE (OHS): 37.4 % (ref 18–48)
RELATIVE LYMPHOCYTE (OHS): 38.2 % (ref 18–48)
RELATIVE MONOCYTE (OHS): 7.3 % (ref 4–15)
RELATIVE MONOCYTE (OHS): 7.9 % (ref 4–15)
RELATIVE NEUTROPHIL (OHS): 47.8 % (ref 38–73)
RELATIVE NEUTROPHIL (OHS): 50.2 % (ref 38–73)
SODIUM SERPL-SCNC: 140 MMOL/L (ref 136–145)
TIBC SERPL-MCNC: 434 UG/DL (ref 250–450)
TRANSFERRIN SERPL-MCNC: 293 MG/DL (ref 200–375)
TRIGL SERPL-MCNC: 83 MG/DL (ref 30–150)
TSH SERPL-ACNC: 2.53 UIU/ML (ref 0.4–4)
WBC # BLD AUTO: 4.55 K/UL (ref 3.9–12.7)
WBC # BLD AUTO: 4.56 K/UL (ref 3.9–12.7)

## 2025-05-14 PROCEDURE — 84466 ASSAY OF TRANSFERRIN: CPT | Performed by: INTERNAL MEDICINE

## 2025-05-14 PROCEDURE — 82306 VITAMIN D 25 HYDROXY: CPT

## 2025-05-14 PROCEDURE — 80061 LIPID PANEL: CPT

## 2025-05-14 PROCEDURE — 84443 ASSAY THYROID STIM HORMONE: CPT

## 2025-05-14 PROCEDURE — 80053 COMPREHEN METABOLIC PANEL: CPT

## 2025-05-14 PROCEDURE — 85025 COMPLETE CBC W/AUTO DIFF WBC: CPT | Mod: PO

## 2025-05-14 PROCEDURE — 36415 COLL VENOUS BLD VENIPUNCTURE: CPT | Mod: PO

## 2025-05-14 PROCEDURE — 85025 COMPLETE CBC W/AUTO DIFF WBC: CPT | Mod: 91

## 2025-05-14 PROCEDURE — 82728 ASSAY OF FERRITIN: CPT | Performed by: INTERNAL MEDICINE

## 2025-05-14 PROCEDURE — 83036 HEMOGLOBIN GLYCOSYLATED A1C: CPT

## 2025-05-14 PROCEDURE — 83880 ASSAY OF NATRIURETIC PEPTIDE: CPT

## 2025-05-14 PROCEDURE — 83525 ASSAY OF INSULIN: CPT

## 2025-05-15 LAB — INSULIN SERPL-ACNC: 11.2 UU/ML

## 2025-06-05 ENCOUNTER — OFFICE VISIT (OUTPATIENT)
Dept: OBSTETRICS AND GYNECOLOGY | Facility: CLINIC | Age: 45
End: 2025-06-05
Payer: COMMERCIAL

## 2025-06-05 VITALS
BODY MASS INDEX: 51.91 KG/M2 | HEIGHT: 63 IN | SYSTOLIC BLOOD PRESSURE: 134 MMHG | WEIGHT: 293 LBS | DIASTOLIC BLOOD PRESSURE: 82 MMHG

## 2025-06-05 DIAGNOSIS — N93.9 ABNORMAL UTERINE BLEEDING (AUB): Primary | ICD-10-CM

## 2025-06-05 PROCEDURE — 99214 OFFICE O/P EST MOD 30 MIN: CPT | Mod: S$GLB,,, | Performed by: OBSTETRICS & GYNECOLOGY

## 2025-06-05 PROCEDURE — 99999 PR PBB SHADOW E&M-EST. PATIENT-LVL IV: CPT | Mod: PBBFAC,,, | Performed by: OBSTETRICS & GYNECOLOGY

## 2025-06-05 NOTE — PROGRESS NOTES
[facility] Note      Subjective:   Chief Complaint:  Follow-up      Date: 2025     Patient ID: Yadira Thornton is a  45 y.o. female.    Contraception: None     No LMP recorded.    The patient presents today due to the following:    Gynecologic issues:  The patient reports a previous history of abnormal uterine bleeding leading to anemia and requiring transfusion.  She reports no treatment following this previous episode     Most recently she reports regular cycles until approximately three weeks ago.  Since that time she has been having daily episodes of bleeding including spotting and clot passage.  No pelvic pain.    Pap smear history:  No history of abnormal Pap smears.  Last Pap smear: 2024    Personal or family history of bleeding or blood clotting disorders:  Negative     Family history:  Breast cancer:  Negative   Colon cancer: POSITIVE - Mother  Gyn related cancer:  Negative    Date: 2025    The patient presents today for follow-up.  She was last seen as above.  In light of her abnormal uterine bleeding lab work / radiology testing was ordered.    She presents to discuss the results of her laboratory evaluation / radiologic evaluation as well as further potential evaluation and treatment options.    Date: 2025    The patient presents today for follow-up.  She was last seen as above.  At her last evaluation she was started on a progesterone oral contraceptive to address her abnormal bleeding.    She reports that the birth control was not appropriately worked and she would like to discuss alternative options in regards to her bleeding.      GYN & OB History  LMP: No LMP recorded.     Age of Menarche:10  Age at first pregnancy:    Age at first live birth:18  Number of months breastfeeding:    Age at Menopause:    Comments:     OB History          2    Para   2    Term   2            AB        Living   2         SAB        IAB        Ectopic         Multiple        Live Births               Obstetric Comments    x 2               Allergies:   Review of patient's allergies indicates:   Allergen Reactions    Sulfa (sulfonamide antibiotics) Swelling    Carbamazepine Rash    Hydrochlorothiazide Rash    Lisinopril Rash       Past Medical History:   Diagnosis Date    Chronic pain of left knee 2021    Hypertension     Obesity     BMI > 79       Past Surgical History:   Procedure Laterality Date     SECTION      x 2       Medications    Current Outpatient Medications:     amLODIPine (NORVASC) 2.5 MG tablet, Take 1 tablet (2.5 mg total) by mouth once daily., Disp: 30 tablet, Rfl: 5    losartan (COZAAR) 50 MG tablet, Take 1 tablet (50 mg total) by mouth once daily., Disp: 30 tablet, Rfl: 5    naproxen (NAPROSYN) 500 MG tablet, Take 1 tablet (500 mg total) by mouth 2 (two) times daily., Disp: 60 tablet, Rfl: 0    norethindrone (MICRONOR) 0.35 mg tablet, Take 1 tablet (0.35 mg total) by mouth once daily., Disp: 90 tablet, Rfl: 3    medroxyPROGESTERone (PROVERA) 5 MG tablet, Take 1 tablet (5 mg total) by mouth 2 (two) times a day. for 7 days, Disp: 14 tablet, Rfl: 0     Social History     Tobacco Use    Smoking status: Never     Passive exposure: Never    Smokeless tobacco: Never   Substance Use Topics    Alcohol use: Not Currently    Drug use: Never       Family History   Problem Relation Name Age of Onset    Colon cancer Mother      No Known Problems Father      Hypertension Maternal Grandmother      Diabetes Maternal Grandmother         Review of Systems (at today's evaluation)  Review of Systems   Constitutional:  Negative for fever and unexpected weight change.   Respiratory:  Negative for cough and shortness of breath.    Cardiovascular:  Negative for chest pain and palpitations.   Gastrointestinal:  Negative for constipation, diarrhea, nausea and vomiting.   Genitourinary:  Negative for dysuria, frequency, hematuria and urgency.        Gyn as  per HPI   Neurological:  Negative for headaches.        Objective:      Vitals:    06/05/25 1500   BP: 134/82     Physical Exam:   Constitutional: She appears well-developed and well-nourished. No distress.    HENT:   Head: Normocephalic.     Neck: No thyroid mass present.    Cardiovascular:  Normal rate.             Pulmonary/Chest: Effort normal. No respiratory distress.        Abdominal: Soft. There is no abdominal tenderness.             Musculoskeletal: Normal range of motion.      Right lower leg: No edema.      Left lower leg: No edema.       Neurological: She is alert.   No gross lesions noted.    Skin: Skin is warm and dry.    Psychiatric: She has a normal mood and affect. Her speech is normal and behavior is normal. Mood normal.       Recent Laboratory Results:    Results      Collected Updated Procedure    02/19/2025 1457 02/19/2025 2137 TSH [[id number]]   Blood    Component Value Units   TSH 3.526 uIU/mL          02/19/2025 1457 02/20/2025 1336 Thyroid Peroxidase Antibody [[id number]]   Blood    Component Value Units   Thyroperoxidase Antibodies <6.0 IU/mL          02/19/2025 1457 02/19/2025 2136 Estradiol [[id number]]   Blood    Component Value Units   Estradiol 216  pg/mL          02/19/2025 1457 02/19/2025 2137 Follicle Stimulating Hormone [[id number]]   Blood    Component Value Units   Follicle Stimulating Hormone 5.61  mIU/mL          02/19/2025 1457 02/19/2025 2137 Luteinizing Hormone [[id number]]   Blood    Component Value Units   LH 10.3  mIU/mL          02/19/2025 1457 02/19/2025 2051 CBC Auto Differential [[id number]]   (Abnormal)   Blood    Component Value Units   WBC 6.03 K/uL   RBC 4.13 M/uL   Hemoglobin 11.4 Low  g/dL   Hematocrit 37.8 %   MCV 92 fL   MCH 27.6 pg   MCHC 30.2 Low  g/dL   RDW 12.9 %   Platelets 297 K/uL   MPV 10.9 fL   Immature Granulocytes 0.2 %   Gran # (ANC) 3.3 K/uL   Immature Grans (Abs) 0.01  K/uL   Lymph # 1.9 K/uL   Mono # 0.6 K/uL   Eos # 0.2 K/uL   Baso # 0.04 K/uL    nRBC 0 /100 WBC   Gran % 55.1 %   Lymph % 30.7 %   Mono % 9.3 %   Eosinophil % 4.0 %   Basophil % 0.7 %   Differential Method Automated           02/19/2025 1457 02/19/2025 2137 Ferritin [[id number]]   Blood    Component Value Units   Ferritin 26 ng/mL                Recent Radiology Results:     2/19/2025 Routine     Narrative & Impression  EXAMINATION:  US PELVIS COMP WITH TRANSVAG NON-OB (XPD)     CLINICAL HISTORY:  Abnormal uterine and vaginal bleeding, unspecified    FINDINGS:  Uterus:     Size: 13.0 x 6.6 x 7.5 cm     Masses: There are multiple uterine lesions thought to represent fibroids, the largest measures 4.5 cm.     Endometrium: Normal in this pre menopausal patient, measuring 5.9 mm.     Right ovary:     Not visualized     Left ovary:     Size: 3.3 x 2.6 x 2.6 cm     Appearance: There is a 2.6 cm cyst seen within the left ovary.     Vascular Flow: Normal.     Impression:     There are uterine lesions thought to represent fibroids, the largest measures 4.5 cm.      Assessment:        1. Abnormal uterine bleeding (AUB)    2. BMI 70 and over, adult        Plan:      Abnormal uterine bleeding (AUB)  -     Place in Outpatient; Standing  -     Vital signs; Standing  -     Bed rest with bathroom privileges; Standing  -     Insert peripheral IV; Standing  -     POCT glucose; Standing  -     Notify physician if BS > 180 for hysterectomy patients; Standing  -     Chlorhexidine (CHG) 2% Wipes; Standing  -     Notify Physician/Vital Signs Parameters Urine output less than 0.5mL/kg/hr (with indwelling catheter) or 30 mL/hr (without indwelling catheter) or blood glucose greater than 200 mg/dL; Standing  -     Notify physician; Standing  -     Notify Physician - Potential Need of Opioid Reversal; Standing  -     Diet NPO; Standing  -     Pregnancy, urine rapid; Standing  -     Case Request Operating Room: HYSTEROSCOPY, WITH DILATION AND CURETTAGE OF UTERUS, ABLATION, ENDOMETRIUM, HYSTEROSCOPIC, INSERTION,  INTRAUTERINE DEVICE (IUD)  -     Full code; Standing  -     Place sequential compression device; Standing    BMI 70 and over, adult    Other orders  -     0.9% NaCl infusion  -     IP VTE LOW RISK PATIENT; Standing  -     mupirocin 2 % ointment      Follow up in about 2 weeks (around 6/19/2025).     The above was reviewed and discussed with the patient.    We discussed the patient is abnormal uterine bleeding, poor response to medical intervention and contributing factors including uterine myoma and BMI.    Conservative, medical, and surgical interventions were discussed, and the patient would like to proceed with surgical intervention.  She will be scheduled for a HYSTEROSCOPY, DILATION AND CURETTAGE, ENDOMETRIAL ABLATION, PLACEMENT OF PROGESTERONE IUD AND OTHER INDICATED PROCEDURES    The pros, cons, risks, benefits, alternatives, and indications of the surgical procedure were discussed in detail with the patient.  We discussed the possibility of allergic reactions, bleeding, infection damage to surrounding structures (cervix, uterus, bladder, ureters, bowel) and other abdominal pelvic organs.  Issues unique to this procedure were discussed.    The patient's questions regarding above were answered and she agrees with this plan.  The patient was provided with the informed consent form and given times reviewed the form.  Questions were answered and consent was obtained    Oral Walter MD  Department OBGYN [facility]

## 2025-06-07 ENCOUNTER — TELEPHONE (OUTPATIENT)
Dept: FAMILY MEDICINE | Facility: CLINIC | Age: 45
End: 2025-06-07
Payer: COMMERCIAL

## 2025-06-08 RX ORDER — MUPIROCIN 20 MG/G
OINTMENT TOPICAL
OUTPATIENT
Start: 2025-06-08

## 2025-06-08 RX ORDER — SODIUM CHLORIDE 9 MG/ML
INJECTION, SOLUTION INTRAVENOUS CONTINUOUS
OUTPATIENT
Start: 2025-06-08

## 2025-06-26 ENCOUNTER — TELEPHONE (OUTPATIENT)
Dept: OBSTETRICS AND GYNECOLOGY | Facility: CLINIC | Age: 45
End: 2025-06-26
Payer: COMMERCIAL

## 2025-06-26 NOTE — TELEPHONE ENCOUNTER
Left message on voicemail to contact office in regard to upcoming procedure and financial obligation.

## 2025-07-02 ENCOUNTER — HOSPITAL ENCOUNTER (OUTPATIENT)
Dept: PREADMISSION TESTING | Facility: HOSPITAL | Age: 45
Discharge: HOME | End: 2025-07-02
Attending: OBSTETRICS & GYNECOLOGY
Payer: COMMERCIAL

## 2025-07-02 VITALS — BODY MASS INDEX: 72.98 KG/M2 | WEIGHT: 293 LBS

## 2025-07-02 RX ORDER — DICLOFENAC SODIUM 75 MG/1
75 TABLET, DELAYED RELEASE ORAL 2 TIMES DAILY
COMMUNITY
Start: 2025-05-15 | End: 2026-05-15

## 2025-07-02 RX ORDER — ASPIRIN 325 MG
50000 TABLET, DELAYED RELEASE (ENTERIC COATED) ORAL
COMMUNITY
Start: 2025-06-03

## 2025-07-02 NOTE — DISCHARGE INSTRUCTIONS
To confirm, Your doctor has instructed you that surgery is scheduled for: 7/7, MONDAY    Please report to [facility], Registration the morning of surgery. You must check-in and receive a wristband before going to your procedure.  [address] [place] [address] [place] [address]    Pre-Op will call the afternoon prior to surgery between 1:00 and 6:00 PM with the final arrival time.  Phone number: [phone]    PLEASE NOTE:  The surgery schedule has many variables which may affect the time of your surgery case.  Family members should be available if your surgery time changes.  Plan to be here the day of your procedure between 4-6 hours.    MEDICATIONS:  TAKE ONLY THESE MEDICATIONS WITH A SMALL SIP OF WATER THE MORNING OF YOUR PROCEDURE:    SEE MED LIST          DO NOT TAKE THESE MEDICATIONS 5-7 DAYS PRIOR to your procedure or per your surgeon's request:   ASPIRIN, ALEVE, ADVIL, IBUPROFEN, FISH OIL VITAMIN E, HERBALS  (May take Tylenol)    ONLY if you are prescribed any types of blood thinners such as:  Aspirin, Coumadin, Plavix, Pradaxa, Xarelto, Aggrenox, Effient, Eliquis, Savasya, Brilinta, or any other, ask your surgeon whether you should stop taking them and how long before surgery you should stop.  You may also need to verify with the prescribing physician if it is ok to stop your medication.      INSTRUCTIONS IMPORTANT!!  Do not eat or drink anything between midnight and the time of your procedure- this includes gum, mints, and candy.  EXCEPT: you may have clear liquids such as:  WATER, BLACK COFFEE, UNSWEET TEA, OR GATORADE (NO RED OR PURPLE) UP TO 2 HOURS PRIOR TO YOUR ARRIVAL TIME.  Do not smoke or drink alcoholic beverages 24 hours prior to your procedure.  Shower the night before AND the morning of your procedure with a Chlorhexidine wash such as Hibiclens or Dial antibacterial soap from the neck down.  Do not get it on your face or in your eyes.  You may use your own shampoo and face wash.  This helps your skin to be as bacteria free as possible.    If you wear contact lenses, dentures, hearing aids or glasses, bring a container to put them in during surgery and give to a family member for safe keeping.  Please leave all jewelry, piercing's and valuables at home. You must remove your false eyelashes prior to surgery.    DO NOT remove hair from the surgery site.  Do not shave the incision site unless you are given specific instructions to do so.    ONLY if you have been diagnosed with sleep apnea please bring your C-PAP machine.  ONLY if you wear home oxygen please bring your portable oxygen tank the day of your procedure.  ONLY if you have a history of OPEN HEART SURGERY you will need a clearance from your Cardiologist per Anesthesia.      ONLY for patients requiring bowel prep, written instructions will be given by your doctor's office.  ONLY if you have any type of stimulator implant or any type of implanted device with a remote control.  Please bring the controller with you the morning of surgery  If your doctor has scheduled you for an overnight stay, bring a small overnight bag with any personal items you need.  Make arrangements in advance for transportation home by a responsible adult. You can not go home in an uber or a cab per hospital policy.  It is not safe to drive a vehicle during the 24 hours after anesthesia.          All  facilities and properties are tobacco free.  Smoking is NOT allowed.   If you have any questions about these instructions, call Pre-Op Admit  Nursing at [phone] or the Pre-Op Day Surgery Unit at [phone].

## 2025-07-03 ENCOUNTER — ANESTHESIA EVENT (OUTPATIENT)
Dept: SURGERY | Facility: HOSPITAL | Age: 45
End: 2025-07-03
Payer: COMMERCIAL

## 2025-07-07 ENCOUNTER — HOSPITAL ENCOUNTER (OUTPATIENT)
Facility: HOSPITAL | Age: 45
Discharge: HOME | End: 2025-07-07
Attending: OBSTETRICS & GYNECOLOGY | Admitting: OBSTETRICS & GYNECOLOGY
Payer: COMMERCIAL

## 2025-07-07 ENCOUNTER — ANESTHESIA (OUTPATIENT)
Dept: SURGERY | Facility: HOSPITAL | Age: 45
End: 2025-07-07
Payer: COMMERCIAL

## 2025-07-07 DIAGNOSIS — N93.9 ABNORMAL UTERINE BLEEDING (AUB): Primary | ICD-10-CM

## 2025-07-07 LAB
B-HCG UR QL: NEGATIVE
CTP QC/QA: YES
POCT GLUCOSE: 85 MG/DL (ref 70–110)

## 2025-07-07 PROCEDURE — 37000009 HC ANESTHESIA EA ADD 15 MINS: Performed by: OBSTETRICS & GYNECOLOGY

## 2025-07-07 PROCEDURE — 27201423 OPTIME MED/SURG SUP & DEVICES STERILE SUPPLY: Performed by: OBSTETRICS & GYNECOLOGY

## 2025-07-07 PROCEDURE — 71000015 HC POSTOP RECOV 1ST HR: Performed by: OBSTETRICS & GYNECOLOGY

## 2025-07-07 PROCEDURE — 36000706: Performed by: OBSTETRICS & GYNECOLOGY

## 2025-07-07 PROCEDURE — 25000003 PHARM REV CODE 250: Performed by: OBSTETRICS & GYNECOLOGY

## 2025-07-07 PROCEDURE — 37000008 HC ANESTHESIA 1ST 15 MINUTES: Performed by: OBSTETRICS & GYNECOLOGY

## 2025-07-07 PROCEDURE — 71000039 HC RECOVERY, EACH ADD'L HOUR: Performed by: OBSTETRICS & GYNECOLOGY

## 2025-07-07 PROCEDURE — 81025 URINE PREGNANCY TEST: CPT | Performed by: OBSTETRICS & GYNECOLOGY

## 2025-07-07 PROCEDURE — 25000003 PHARM REV CODE 250: Performed by: NURSE ANESTHETIST, CERTIFIED REGISTERED

## 2025-07-07 PROCEDURE — 71000033 HC RECOVERY, INTIAL HOUR: Performed by: OBSTETRICS & GYNECOLOGY

## 2025-07-07 PROCEDURE — 58300 INSERT INTRAUTERINE DEVICE: CPT | Mod: ,,, | Performed by: OBSTETRICS & GYNECOLOGY

## 2025-07-07 PROCEDURE — C1782 MORCELLATOR: HCPCS | Performed by: OBSTETRICS & GYNECOLOGY

## 2025-07-07 PROCEDURE — 36000707: Performed by: OBSTETRICS & GYNECOLOGY

## 2025-07-07 PROCEDURE — 58558 HYSTEROSCOPY BIOPSY: CPT | Mod: ,,, | Performed by: OBSTETRICS & GYNECOLOGY

## 2025-07-07 PROCEDURE — 94799 UNLISTED PULMONARY SVC/PX: CPT

## 2025-07-07 PROCEDURE — 63600175 PHARM REV CODE 636 W HCPCS: Performed by: OBSTETRICS & GYNECOLOGY

## 2025-07-07 PROCEDURE — 25000003 PHARM REV CODE 250: Performed by: ANESTHESIOLOGY

## 2025-07-07 PROCEDURE — 63600175 PHARM REV CODE 636 W HCPCS: Performed by: NURSE ANESTHETIST, CERTIFIED REGISTERED

## 2025-07-07 DEVICE — IMPLANTABLE DEVICE: Type: IMPLANTABLE DEVICE | Site: VAGINA | Status: FUNCTIONAL

## 2025-07-07 RX ORDER — ONDANSETRON HYDROCHLORIDE 2 MG/ML
4 INJECTION, SOLUTION INTRAVENOUS ONCE AS NEEDED
Status: DISCONTINUED | OUTPATIENT
Start: 2025-07-07 | End: 2025-07-07 | Stop reason: HOSPADM

## 2025-07-07 RX ORDER — FENTANYL CITRATE 50 UG/ML
INJECTION, SOLUTION INTRAMUSCULAR; INTRAVENOUS
Status: DISCONTINUED | OUTPATIENT
Start: 2025-07-07 | End: 2025-07-07

## 2025-07-07 RX ORDER — LIDOCAINE HYDROCHLORIDE 20 MG/ML
INJECTION, SOLUTION EPIDURAL; INFILTRATION; INTRACAUDAL; PERINEURAL
Status: DISCONTINUED | OUTPATIENT
Start: 2025-07-07 | End: 2025-07-07

## 2025-07-07 RX ORDER — IBUPROFEN 600 MG/1
600 TABLET, FILM COATED ORAL EVERY 6 HOURS PRN
Qty: 40 TABLET | Refills: 1 | Status: SHIPPED | OUTPATIENT
Start: 2025-07-07

## 2025-07-07 RX ORDER — MIDAZOLAM HYDROCHLORIDE 1 MG/ML
INJECTION INTRAMUSCULAR; INTRAVENOUS
Status: DISCONTINUED | OUTPATIENT
Start: 2025-07-07 | End: 2025-07-07

## 2025-07-07 RX ORDER — SODIUM CHLORIDE, SODIUM LACTATE, POTASSIUM CHLORIDE, CALCIUM CHLORIDE 600; 310; 30; 20 MG/100ML; MG/100ML; MG/100ML; MG/100ML
INJECTION, SOLUTION INTRAVENOUS CONTINUOUS
Status: DISCONTINUED | OUTPATIENT
Start: 2025-07-07 | End: 2025-07-07 | Stop reason: HOSPADM

## 2025-07-07 RX ORDER — OXYCODONE AND ACETAMINOPHEN 5; 325 MG/1; MG/1
1 TABLET ORAL EVERY 4 HOURS PRN
Qty: 6 TABLET | Refills: 0 | Status: SHIPPED | OUTPATIENT
Start: 2025-07-07

## 2025-07-07 RX ORDER — DIPHENHYDRAMINE HYDROCHLORIDE 50 MG/ML
12.5 INJECTION, SOLUTION INTRAMUSCULAR; INTRAVENOUS EVERY 6 HOURS PRN
Status: DISCONTINUED | OUTPATIENT
Start: 2025-07-07 | End: 2025-07-07 | Stop reason: HOSPADM

## 2025-07-07 RX ORDER — ACETAMINOPHEN 10 MG/ML
INJECTION, SOLUTION INTRAVENOUS
Status: DISCONTINUED | OUTPATIENT
Start: 2025-07-07 | End: 2025-07-07

## 2025-07-07 RX ORDER — ROCURONIUM BROMIDE 10 MG/ML
INJECTION, SOLUTION INTRAVENOUS
Status: DISCONTINUED | OUTPATIENT
Start: 2025-07-07 | End: 2025-07-07

## 2025-07-07 RX ORDER — HYDROMORPHONE HYDROCHLORIDE 2 MG/ML
0.2 INJECTION, SOLUTION INTRAMUSCULAR; INTRAVENOUS; SUBCUTANEOUS EVERY 5 MIN PRN
Status: DISCONTINUED | OUTPATIENT
Start: 2025-07-07 | End: 2025-07-07 | Stop reason: HOSPADM

## 2025-07-07 RX ORDER — MUPIROCIN 20 MG/G
OINTMENT TOPICAL
Status: DISCONTINUED | OUTPATIENT
Start: 2025-07-07 | End: 2025-07-07 | Stop reason: HOSPADM

## 2025-07-07 RX ORDER — SODIUM CHLORIDE 9 MG/ML
INJECTION, SOLUTION INTRAVENOUS CONTINUOUS
Status: DISCONTINUED | OUTPATIENT
Start: 2025-07-07 | End: 2025-07-07 | Stop reason: HOSPADM

## 2025-07-07 RX ORDER — SUCCINYLCHOLINE CHLORIDE 20 MG/ML
INJECTION INTRAMUSCULAR; INTRAVENOUS
Status: DISCONTINUED | OUTPATIENT
Start: 2025-07-07 | End: 2025-07-07

## 2025-07-07 RX ORDER — LIDOCAINE HYDROCHLORIDE 10 MG/ML
1 INJECTION, SOLUTION EPIDURAL; INFILTRATION; INTRACAUDAL; PERINEURAL ONCE
Status: DISCONTINUED | OUTPATIENT
Start: 2025-07-07 | End: 2025-07-07 | Stop reason: HOSPADM

## 2025-07-07 RX ORDER — DEXAMETHASONE SODIUM PHOSPHATE 4 MG/ML
INJECTION, SOLUTION INTRA-ARTICULAR; INTRALESIONAL; INTRAMUSCULAR; INTRAVENOUS; SOFT TISSUE
Status: DISCONTINUED | OUTPATIENT
Start: 2025-07-07 | End: 2025-07-07

## 2025-07-07 RX ORDER — ONDANSETRON HYDROCHLORIDE 2 MG/ML
INJECTION, SOLUTION INTRAVENOUS
Status: DISCONTINUED | OUTPATIENT
Start: 2025-07-07 | End: 2025-07-07

## 2025-07-07 RX ORDER — OXYCODONE HYDROCHLORIDE 5 MG/1
5 TABLET ORAL
Status: DISCONTINUED | OUTPATIENT
Start: 2025-07-07 | End: 2025-07-07 | Stop reason: HOSPADM

## 2025-07-07 RX ORDER — FENTANYL CITRATE 50 UG/ML
25 INJECTION, SOLUTION INTRAMUSCULAR; INTRAVENOUS EVERY 5 MIN PRN
Status: DISCONTINUED | OUTPATIENT
Start: 2025-07-07 | End: 2025-07-07 | Stop reason: HOSPADM

## 2025-07-07 RX ORDER — KETOROLAC TROMETHAMINE 30 MG/ML
INJECTION, SOLUTION INTRAMUSCULAR; INTRAVENOUS
Status: DISCONTINUED | OUTPATIENT
Start: 2025-07-07 | End: 2025-07-07

## 2025-07-07 RX ORDER — GLUCAGON 1 MG
1 KIT INJECTION
Status: DISCONTINUED | OUTPATIENT
Start: 2025-07-07 | End: 2025-07-07 | Stop reason: HOSPADM

## 2025-07-07 RX ORDER — PROPOFOL 10 MG/ML
VIAL (ML) INTRAVENOUS
Status: DISCONTINUED | OUTPATIENT
Start: 2025-07-07 | End: 2025-07-07

## 2025-07-07 RX ADMIN — GLYCOPYRROLATE 0.2 MG: 0.2 INJECTION, SOLUTION INTRAMUSCULAR; INTRAVITREAL at 01:07

## 2025-07-07 RX ADMIN — DEXAMETHASONE SODIUM PHOSPHATE 8 MG: 4 INJECTION, SOLUTION INTRA-ARTICULAR; INTRALESIONAL; INTRAMUSCULAR; INTRAVENOUS; SOFT TISSUE at 01:07

## 2025-07-07 RX ADMIN — MUPIROCIN 1 G: 20 OINTMENT TOPICAL at 10:07

## 2025-07-07 RX ADMIN — SUCCINYLCHOLINE CHLORIDE 120 MG: 20 INJECTION, SOLUTION INTRAMUSCULAR; INTRAVENOUS at 01:07

## 2025-07-07 RX ADMIN — ONDANSETRON 8 MG: 2 INJECTION INTRAMUSCULAR; INTRAVENOUS at 01:07

## 2025-07-07 RX ADMIN — PROPOFOL 100 MG: 10 INJECTION, EMULSION INTRAVENOUS at 01:07

## 2025-07-07 RX ADMIN — MIDAZOLAM HYDROCHLORIDE 2 MG: 1 INJECTION, SOLUTION INTRAMUSCULAR; INTRAVENOUS at 12:07

## 2025-07-07 RX ADMIN — LIDOCAINE HYDROCHLORIDE 100 MG: 20 INJECTION, SOLUTION EPIDURAL; INFILTRATION; INTRACAUDAL at 01:07

## 2025-07-07 RX ADMIN — PROPOFOL 200 MG: 10 INJECTION, EMULSION INTRAVENOUS at 01:07

## 2025-07-07 RX ADMIN — OXYCODONE 5 MG: 5 TABLET ORAL at 02:07

## 2025-07-07 RX ADMIN — ACETAMINOPHEN 1000 MG: 10 INJECTION INTRAVENOUS at 01:07

## 2025-07-07 RX ADMIN — SODIUM CHLORIDE, SODIUM GLUCONATE, SODIUM ACETATE, POTASSIUM CHLORIDE AND MAGNESIUM CHLORIDE: 526; 502; 368; 37; 30 INJECTION, SOLUTION INTRAVENOUS at 10:07

## 2025-07-07 RX ADMIN — KETOROLAC TROMETHAMINE 30 MG: 30 INJECTION, SOLUTION INTRAMUSCULAR; INTRAVENOUS at 01:07

## 2025-07-07 RX ADMIN — FENTANYL CITRATE 50 MCG: 50 INJECTION, SOLUTION INTRAMUSCULAR; INTRAVENOUS at 01:07

## 2025-07-07 RX ADMIN — LEVONORGESTREL 1 INTRA UTERINE DEVICE: 52 INTRAUTERINE DEVICE INTRAUTERINE at 01:07

## 2025-07-07 RX ADMIN — ROCURONIUM BROMIDE 10 MG: 10 INJECTION, SOLUTION INTRAVENOUS at 01:07

## 2025-07-07 NOTE — ANESTHESIA POSTPROCEDURE EVALUATION
Anesthesia Post Evaluation    Patient: Yadira Thornton    Procedure(s) Performed: Procedure(s) (LRB):  HYSTEROSCOPY, WITH DILATION AND CURETTAGE OF UTERUS (N/A)  INSERTION, INTRAUTERINE DEVICE (IUD) (N/A)    Final Anesthesia Type: general      Patient location during evaluation: PACU  Patient participation: Yes- Able to Participate  Level of consciousness: awake and alert  Post-procedure vital signs: reviewed and stable  Pain management: adequate  Airway patency: patent    PONV status at discharge: No PONV  Anesthetic complications: no      Cardiovascular status: hemodynamically stable  Respiratory status: unassisted and room air  Hydration status: euvolemic  Follow-up not needed.              Vitals Value Taken Time   /76 07/07/25 15:25   Temp 36.7 °C (98.1 °F) 07/07/25 14:45   Pulse 82 07/07/25 15:25   Resp 16 07/07/25 15:25   SpO2 96 % 07/07/25 15:25         Event Time   Out of Recovery 15:10:00         Pain/Abran Score: Pain Rating Prior to Med Admin: 4 (7/7/2025  2:45 PM)  Pain Rating Post Med Admin: 2 (7/7/2025  3:25 PM)  Abran Score: 10 (7/7/2025  3:00 PM)  Modified Abran Score: 19 (7/7/2025  3:25 PM)

## 2025-07-07 NOTE — DISCHARGE INSTRUCTIONS
"Discharge Instructions: After Your Surgery/Procedure  Youve just had surgery. During surgery you were given medicine called anesthesia to keep you relaxed and free of pain. After surgery you may have some pain or nausea. This is common. Here are some tips for feeling better and getting well after surgery.     Stay on schedule with your medication.   Going home  Your doctor or nurse will show you how to take care of yourself when you go home. He or she will also answer your questions. Have an adult family member or friend drive you home.      For your safety we recommend these precaution for the first 24 hours after your procedure:  Do not drive or use heavy equipment.  Do not make important decisions or sign legal papers.  Do not drink alcohol.  Have someone stay with you, if needed. He or she can watch for problems and help keep you safe.  Your concentration, balance, coordination, and judgement may be impaired for many hours after anesthesia.  Use caution when ambulating or standing up.     You may feel weak and "washed out" after anesthesia and surgery.      Subtle residual effects of general anesthesia or sedation with regional / local anesthesia can last more than 24 hours.  Rest for the remainder of the day or longer if your Doctor/Surgeon has advised you to do so.  Although you may feel normal within the first 24 hours, your reflexes and mental ability may be impaired without you realizing it.  You may feel dizzy, lightheaded or sleepy for 24 hours or longer.      Be sure to go to all follow-up visits with your doctor. And rest after your surgery for as long as your doctor tells you to.  Coping with pain  If you have pain after surgery, pain medicine will help you feel better. Take it as told, before pain becomes severe. Also, ask your doctor or pharmacist about other ways to control pain. This might be with heat, ice, or relaxation. And follow any other instructions your surgeon or nurse gives you.  Tips " for taking pain medicine  To get the best relief possible, remember these points:  Pain medicines can upset your stomach. Taking them with a little food may help.  Most pain relievers taken by mouth need at least 20 to 30 minutes to start to work.  Taking medicine on a schedule can help you remember to take it. Try to time your medicine so that you can take it before starting an activity. This might be before you get dressed, go for a walk, or sit down for dinner.  Constipation is a common side effect of pain medicines. Call your doctor before taking any medicines such as laxatives or stool softeners to help ease constipation. Also ask if you should skip any foods. Drinking lots of fluids and eating foods such as fruits and vegetables that are high in fiber can also help. Remember, do not take laxatives unless your surgeon has prescribed them.  Drinking alcohol and taking pain medicine can cause dizziness and slow your breathing. It can even be deadly. Do not drink alcohol while taking pain medicine.  Pain medicine can make you react more slowly to things. Do not drive or run machinery while taking pain medicine.  Your health care provider may tell you to take acetaminophen to help ease your pain. Ask him or her how much you are supposed to take each day. Acetaminophen or other pain relievers may interact with your prescription medicines or other over-the-counter (OTC) drugs. Some prescription medicines have acetaminophen and other ingredients. Using both prescription and OTC acetaminophen for pain can cause you to overdose. Read the labels on your OTC medicines with care. This will help you to clearly know the list of ingredients, how much to take, and any warnings. It may also help you not take too much acetaminophen. If you have questions or do not understand the information, ask your pharmacist or health care provider to explain it to you before you take the OTC medicine.  Managing nausea  Some people have an  upset stomach after surgery. This is often because of anesthesia, pain, or pain medicine, or the stress of surgery. These tips will help you handle nausea and eat healthy foods as you get better. If you were on a special food plan before surgery, ask your doctor if you should follow it while you get better. These tips may help:  Do not push yourself to eat. Your body will tell you when to eat and how much.  Start off with clear liquids and soup. They are easier to digest.  Next try semi-solid foods, such as mashed potatoes, applesauce, and gelatin, as you feel ready.  Slowly move to solid foods. Dont eat fatty, rich, or spicy foods at first.  Do not force yourself to have 3 large meals a day. Instead eat smaller amounts more often.  Take pain medicines with a small amount of solid food, such as crackers or toast, to avoid nausea.     Call your surgeon if  You still have pain an hour after taking medicine. The medicine may not be strong enough.  You feel too sleepy, dizzy, or groggy. The medicine may be too strong.  You have side effects like nausea, vomiting, or skin changes, such as rash, itching, or hives.       If you have obstructive sleep apnea  You were given anesthesia medicine during surgery to keep you comfortable and free of pain. After surgery, you may have more apnea spells because of this medicine and other medicines you were given. The spells may last longer than usual.   At home:  Keep using the continuous positive airway pressure (CPAP) device when you sleep. Unless your health care provider tells you not to, use it when you sleep, day or night. CPAP is a common device used to treat obstructive sleep apnea.  Talk with your provider before taking any pain medicine, muscle relaxants, or sedatives. Your provider will tell you about the possible dangers of taking these medicines.  © 6771-2449 [employer]. [address] [place] [address], [place] [address]. All rights reserved. This information is  not intended as a substitute for professional medical care. Always follow your healthcare professional's instructions.          Using an Incentive Spirometer    An incentive spirometer is a device that helps you do deep breathing exercises. These exercises expand your lungs, aid in circulation, and help prevent pneumonia. Deep breathing exercises also help you breathe better and improve the function of your lungs by:  Keeping your lungs clear  Strengthening your breathing muscles  Helping prevent respiratory complications or problems  The incentive spirometer gives you a way to take an active part in recover. A nurse or therapist will teach you breathing exercises. To do these exercises, you will breathe in through your mouth and not your nose. The incentive spirometer only works correctly if you breathe in through your mouth.  Steps to clear lungs  Step 1. Exhale normally. Then, inhale normally.  Relax and breathe out.  Step 2. Place your lips tightly around the mouthpiece.  Make sure the device is upright and not tilted.  Step 3. Inhale as much air as you can through the mouthpiece (don't breath through your nose).  Inhale slowly and deeply.  Hold your breath long enough to keep the balls or disk raised for at least 3 to 5 seconds, or as instructed by your healthcare provider.  Some spirometers have an indicator to let you know that you are breathing in too fast. If the indicator goes off, breathe in more slowly.  Step 4. Repeat the exercise regularly.  Do this exercise every hour while you're awake, or as instructed by your healthcare provider.  If you were taught deep breathing and coughing exercises, do them regularly as instructed by your healthcare provider.       We hope your stay was comfortable as you heal now, mend and rest.    For we have enjoyed taking care of you by giving your our best.    And as you get better, by regaining your health and strength;   We count it as a privilege to have served you and  hope your time at [facility] was well spent.      Thank  You!!!

## 2025-07-07 NOTE — PLAN OF CARE
Patient able to void with no problems noted.  Tolerating po intake well with no complaints of nausea/vomiting.  Discharge instructions given to pt and family/friend, verbalized understanding and questions answered. Handouts provided. Belongings given back to pt. IV removed- catheter intact. Discharge via wheelchair.

## 2025-07-07 NOTE — ANESTHESIA PROCEDURE NOTES
Intubation    Date/Time: 7/7/2025 1:05 PM    Performed by: Alisha Ayala CRNA  Authorized by: Diomedes Richter MD    Intubation:     Induction:  Intravenous    Intubated:  Postinduction    Mask Ventilation:  Easy mask    Attempts:  1    Attempted By:  CRNA    Method of Intubation:  Video laryngoscopy    Blade:  Serra 3    Laryngeal View Grade: Grade I - full view of cords      Difficult Airway Encountered?: No      Complications:  None    Airway Device:  Oral endotracheal tube    Airway Device Size:  7.5    Style/Cuff Inflation:  Cuffed (inflated to minimal occlusive pressure)    Tube secured:  22    Secured at:  The lips    Placement Verified By:  Capnometry    Complicating Factors:  None    Findings Post-Intubation:  BS equal bilateral and atraumatic/condition of teeth unchanged

## 2025-07-07 NOTE — ANESTHESIA PREPROCEDURE EVALUATION
07/07/2025  Yadira Thornton is a 45 y.o., female.      Pre-op Assessment    I have reviewed the Patient Summary Reports.     I have reviewed the Nursing Notes. I have reviewed the NPO Status.   I have reviewed the Medications.     Review of Systems  Anesthesia Hx:  No problems with previous Anesthesia                Social:  Non-Smoker       Hematology/Oncology:       -- Anemia:                                  Cardiovascular:     Hypertension                                          Neurological:    Neuromuscular Disease,                                   Endocrine:        Morbid Obesity / BMI > 40      Physical Exam  General: Cooperative, Alert and Oriented    Airway:  Mallampati: II   Mouth Opening: Normal  TM Distance: Normal  Tongue: Normal  Neck: Girth Increased    Dental:  Intact    Chest/Lungs:  Normal Respiratory Rate    Heart:  Rate: Normal        Anesthesia Plan  Type of Anesthesia, risks & benefits discussed:    Anesthesia Type: Gen ETT  Intra-op Monitoring Plan: Standard ASA Monitors  Post Op Pain Control Plan: multimodal analgesia and IV/PO Opioids PRN  Induction:  IV  Airway Plan: Direct and Video, Post-Induction  Informed Consent: Informed consent signed with the Patient and all parties understand the risks and agree with anesthesia plan.  All questions answered.   ASA Score: 3  Day of Surgery Review of History & Physical: H&P Update referred to the surgeon/provider.    Ready For Surgery From Anesthesia Perspective.     .

## 2025-07-07 NOTE — TRANSFER OF CARE
"Anesthesia Transfer of Care Note    Patient: Yadira Thornton    Procedure(s) Performed: Procedure(s) (LRB):  HYSTEROSCOPY, WITH DILATION AND CURETTAGE OF UTERUS (N/A)  INSERTION, INTRAUTERINE DEVICE (IUD) (N/A)    Patient location: PACU    Anesthesia Type: general    Transport from OR: Transported from OR on 2-3 L/min O2 by NC with adequate spontaneous ventilation    Post pain: adequate analgesia    Post assessment: no apparent anesthetic complications and tolerated procedure well    Post vital signs: stable    Level of consciousness: sedated and responds to stimulation    Nausea/Vomiting: no nausea/vomiting    Complications: none    Transfer of care protocol was followed      Last vitals: Visit Vitals  BP (!) 124/58 (BP Location: Left forearm, Patient Position: Lying)   Pulse 83   Temp 36.7 °C (98.1 °F) (Skin)   Resp 18   Ht 5' 3" (1.6 m)   Wt (!) 186.9 kg (412 lb)   LMP 06/24/2025 (Exact Date)   SpO2 97%   Breastfeeding No   BMI 72.98 kg/m²     "

## 2025-07-07 NOTE — H&P
[facility] Note        Subjective:   Chief Complaint:  Follow-up        Date: 2025     Subjective  Patient ID: Yadira Thornton is a  45 y.o. female.     Contraception: None      No LMP recorded.     The patient presents today due to the following:     Gynecologic issues:  The patient reports a previous history of abnormal uterine bleeding leading to anemia and requiring transfusion.  She reports no treatment following this previous episode      Most recently she reports regular cycles until approximately three weeks ago.  Since that time she has been having daily episodes of bleeding including spotting and clot passage.  No pelvic pain.     Pap smear history:  No history of abnormal Pap smears.  Last Pap smear: 2024     Personal or family history of bleeding or blood clotting disorders:  Negative      Family history:  Breast cancer:  Negative   Colon cancer: POSITIVE - Mother  Gyn related cancer:  Negative     Date: 2025     The patient presents today for follow-up.  She was last seen as above.  In light of her abnormal uterine bleeding lab work / radiology testing was ordered.     She presents to discuss the results of her laboratory evaluation / radiologic evaluation as well as further potential evaluation and treatment options.     Date: 2025     The patient presents today for follow-up.  She was last seen as above.  At her last evaluation she was started on a progesterone oral contraceptive to address her abnormal bleeding.    She reports that the birth control was not appropriately worked and she would like to discuss alternative options in regards to her bleeding.       GYN & OB History  LMP: No LMP recorded.      Age of Menarche:10  Age at first pregnancy:    Age at first live birth:18  Number of months breastfeeding:    Age at Menopause:    Comments:     OB History            2    Para   2    Term   2            AB        Living   2           SAB         IAB        Ectopic        Multiple        Live Births                 Obstetric Comments    x 2                   Allergies:        Review of patient's allergies indicates:   Allergen Reactions    Sulfa (sulfonamide antibiotics) Swelling    Carbamazepine Rash    Hydrochlorothiazide Rash    Lisinopril Rash              Past Medical History:   Diagnosis Date    Chronic pain of left knee 2021    Hypertension      Obesity       BMI > 79               Past Surgical History:   Procedure Laterality Date     SECTION         x 2         Medications    Current Medications      Current Outpatient Medications:     amLODIPine (NORVASC) 2.5 MG tablet, Take 1 tablet (2.5 mg total) by mouth once daily., Disp: 30 tablet, Rfl: 5    losartan (COZAAR) 50 MG tablet, Take 1 tablet (50 mg total) by mouth once daily., Disp: 30 tablet, Rfl: 5    naproxen (NAPROSYN) 500 MG tablet, Take 1 tablet (500 mg total) by mouth 2 (two) times daily., Disp: 60 tablet, Rfl: 0    norethindrone (MICRONOR) 0.35 mg tablet, Take 1 tablet (0.35 mg total) by mouth once daily., Disp: 90 tablet, Rfl: 3    medroxyPROGESTERone (PROVERA) 5 MG tablet, Take 1 tablet (5 mg total) by mouth 2 (two) times a day. for 7 days, Disp: 14 tablet, Rfl: 0         Social History   Social History            Tobacco Use    Smoking status: Never       Passive exposure: Never    Smokeless tobacco: Never   Substance Use Topics    Alcohol use: Not Currently    Drug use: Never                   Family History   Problem Relation Name Age of Onset    Colon cancer Mother        No Known Problems Father        Hypertension Maternal Grandmother        Diabetes Maternal Grandmother             Review of Systems (at today's evaluation)  Review of Systems   Constitutional:  Negative for fever and unexpected weight change.   Respiratory:  Negative for cough and shortness of breath.    Cardiovascular:  Negative for chest pain and palpitations.   Gastrointestinal:   Negative for constipation, diarrhea, nausea and vomiting.   Genitourinary:  Negative for dysuria, frequency, hematuria and urgency.        Gyn as per HPI   Neurological:  Negative for headaches.         Objective:      Objective      Vitals:     06/05/25 1500   BP: 134/82      Physical Exam:   Constitutional: She appears well-developed and well-nourished. No distress.    HENT:   Head: Normocephalic.     Neck: No thyroid mass present.    Cardiovascular:  Normal rate.             Pulmonary/Chest: Effort normal. No respiratory distress.         Abdominal: Soft. There is no abdominal tenderness.             Musculoskeletal: Normal range of motion.      Right lower leg: No edema.      Left lower leg: No edema.       Neurological: She is alert.   No gross lesions noted.    Skin: Skin is warm and dry.    Psychiatric: She has a normal mood and affect. Her speech is normal and behavior is normal. Mood normal.      Recent Laboratory Results:     Results               Collected Updated Procedure      02/19/2025 1457 02/19/2025 2137 TSH [[id number]]   Blood    Component Value Units   TSH 3.526 uIU/mL             02/19/2025 1457 02/20/2025 1336 Thyroid Peroxidase Antibody [[id number]]   Blood    Component Value Units   Thyroperoxidase Antibodies <6.0 IU/mL             02/19/2025 1457 02/19/2025 2136 Estradiol [[id number]]   Blood    Component Value Units   Estradiol 216  pg/mL             02/19/2025 1457 02/19/2025 2137 Follicle Stimulating Hormone [[id number]]   Blood    Component Value Units   Follicle Stimulating Hormone 5.61  mIU/mL             02/19/2025 1457 02/19/2025 2137 Luteinizing Hormone [[id number]]   Blood    Component Value Units   LH 10.3  mIU/mL             02/19/2025 1457 02/19/2025 2051 CBC Auto Differential [[id number]]   (Abnormal)   Blood    Component Value Units   WBC 6.03 K/uL   RBC 4.13 M/uL   Hemoglobin 11.4 Low  g/dL   Hematocrit 37.8 %   MCV 92 fL   MCH 27.6 pg   MCHC 30.2 Low  g/dL   RDW 12.9 %    Platelets 297 K/uL   MPV 10.9 fL   Immature Granulocytes 0.2 %   Gran # (ANC) 3.3 K/uL   Immature Grans (Abs) 0.01  K/uL   Lymph # 1.9 K/uL   Mono # 0.6 K/uL   Eos # 0.2 K/uL   Baso # 0.04 K/uL   nRBC 0 /100 WBC   Gran % 55.1 %   Lymph % 30.7 %   Mono % 9.3 %   Eosinophil % 4.0 %   Basophil % 0.7 %   Differential Method Automated               02/19/2025 1457 02/19/2025 2137 Ferritin [[id number]]   Blood    Component Value Units   Ferritin 26 ng/mL                       Recent Radiology Results:      2/19/2025 Routine      Narrative & Impression  EXAMINATION:  US PELVIS COMP WITH TRANSVAG NON-OB (XPD)     CLINICAL HISTORY:  Abnormal uterine and vaginal bleeding, unspecified     FINDINGS:  Uterus:     Size: 13.0 x 6.6 x 7.5 cm     Masses: There are multiple uterine lesions thought to represent fibroids, the largest measures 4.5 cm.     Endometrium: Normal in this pre menopausal patient, measuring 5.9 mm.     Right ovary:     Not visualized     Left ovary:     Size: 3.3 x 2.6 x 2.6 cm     Appearance: There is a 2.6 cm cyst seen within the left ovary.     Vascular Flow: Normal.     Impression:     There are uterine lesions thought to represent fibroids, the largest measures 4.5 cm.        Assessment:      Assessment  1. Abnormal uterine bleeding (AUB)    2. BMI 70 and over, adult          Plan:        Plan  Abnormal uterine bleeding (AUB)  -     Place in Outpatient; Standing  -     Vital signs; Standing  -     Bed rest with bathroom privileges; Standing  -     Insert peripheral IV; Standing  -     POCT glucose; Standing  -     Notify physician if BS > 180 for hysterectomy patients; Standing  -     Chlorhexidine (CHG) 2% Wipes; Standing  -     Notify Physician/Vital Signs Parameters Urine output less than 0.5mL/kg/hr (with indwelling catheter) or 30 mL/hr (without indwelling catheter) or blood glucose greater than 200 mg/dL; Standing  -     Notify physician; Standing  -     Notify Physician - Potential Need of Opioid  Reversal; Standing  -     Diet NPO; Standing  -     Pregnancy, urine rapid; Standing  -     Case Request Operating Room: HYSTEROSCOPY, WITH DILATION AND CURETTAGE OF UTERUS, ABLATION, ENDOMETRIUM, HYSTEROSCOPIC, INSERTION, INTRAUTERINE DEVICE (IUD)  -     Full code; Standing  -     Place sequential compression device; Standing     BMI 70 and over, adult     Other orders  -     0.9% NaCl infusion  -     IP VTE LOW RISK PATIENT; Standing  -     mupirocin 2 % ointment        Follow up in about 2 weeks (around 6/19/2025).      The above was reviewed and discussed with the patient.     We discussed the patient is abnormal uterine bleeding, poor response to medical intervention and contributing factors including uterine myoma and BMI.     Conservative, medical, and surgical interventions were discussed, and the patient would like to proceed with surgical intervention.  She will be scheduled for a HYSTEROSCOPY, DILATION AND CURETTAGE, ENDOMETRIAL ABLATION, PLACEMENT OF PROGESTERONE IUD AND OTHER INDICATED PROCEDURES     The pros, cons, risks, benefits, alternatives, and indications of the surgical procedure were discussed in detail with the patient.  We discussed the possibility of allergic reactions, bleeding, infection damage to surrounding structures (cervix, uterus, bladder, ureters, bowel) and other abdominal pelvic organs.  Issues unique to this procedure were discussed.     The patient's questions regarding above were answered and she agrees with this plan.  The patient was provided with the informed consent form and given times reviewed the form.  Questions were answered and consent was obtained     Oral Walter MD  Department OBGYN [facility]

## 2025-07-07 NOTE — PLAN OF CARE
Released per anesthesia when criteria met, pain controlled skin warm and dry. No nausea, emesis, garland pad and blue po pad in place. Encourage deep breaths. Instructed on IS, encourage use. Patient has all belongings in post op.

## 2025-07-07 NOTE — OP NOTE
[facility]  Department of Obstetrics & Gynecology  Operative Note      PATIENT NAME: Yadira Thornton    MRN: [id number]  TODAY'S DATE: 07/07/2025  ADMIT DATE: 7/7/2025                              OPERATIVE REPORT:  7/7/2025    SURGEON: Oral Watler MD    ASSISTANT:  None    PREOPERATIVE DIAGNOSIS:    Abnormal uterine bleeding  BMI 73    POSTOPERATIVE DIAGNOSIS:    Abnormal uterine bleeding  BMI 73    PROCEDURE:   1.  Hysteroscopy  2.  Dilation and curettage (via MyoSure Lite device and curette)  3.  Placement of Mirena IUD     ANESTHESIA: General endotracheal anesthesia    ESTIMATED BLOOD LOSS: Minimal     FLUIDS: 800 mL of crystalloid.    URINE OUTPUT: 100 cc    PATHOLOGY:  Endometrial curettage    FINDINGS:   Normal-appearing vaginal vault, cervix and endocervical canal.      The uterus sounded to 10 cm.  Normal endometrial cavity without visible myoma or polyps.   Bilateral tubal ostia visualized.       PROCEDURE IN DETAIL:   Prior to the procedure the patient was seen and evaluated in the preoperative area.  Potential risks associated with the surgery were once again reviewed and discussed.  The patient's questions were answered and she is in agreement with proceeding with the current plan..    The patient was subsequently taken to the [facility] operating room, where general endotracheal anesthesia was initiated by the anesthesia department.      The patient was prepped and draped in the usual sterile fashion in the dorsal lithotomy position in the [facility].  The bladder was drained of approximately 100 cc of clear urine.      At this time a time-out/safety procedure was performed with all participating parties in agreement as to the preoperative and operative plan.  Sequential compression hose were on the patient.    A bivalved speculum was introduced into the vagina and the anterior lip of the cervix was grasped with a the single-tooth tenaculum. The uterus was gently sounded  and the cervix was gently dilated to allow for the hysteroscope.     At this time hysteroscopic evaluation of the endometrial cavity was performed with the findings as mentioned above noted.    Following visualization of all four quadrants of the endometrial cavity, a curettage was performed under direct visualization.  This was initially performed covering all four quadrants with the MyoSure Lite device.  Due to the overall thin nature of the uterus a 2nd curettage was performed once again covering all four quadrants with the curette.  Following curettage, good hemostasis with an intact appearing cavity were noted on follow up hysteroscope.      The uterus had previously been sounded to 10 cm which was confirmed with the hysteroscope.  A Mirena IUD was appropriately loaded into the insertion device.  The device was advanced the uterine fundus and the IUD was inserted without difficulty using sterile technique.  The string was cut to approximately 2 cm in length.    The tenaculum was removed from the anterior with the cervix.  After inappropriate amount of time evaluation noted good hemostasis at the tenaculum site as well as the cervical os.    Final count was reported as correct per nursing.  The patient tolerated the procedure well.    Oral Walter M.D., FACOG  Department OBGYN [facility]

## 2025-07-07 NOTE — INTERVAL H&P NOTE
The patient has been examined and the H&P has been reviewed:    I concur with the findings and changes have been noted since the H&P was written:  The operative plan was discussed with the patient.  At this time we will proceed with hysteroscopy, dilation and curettage and placement of a progesterone IUD.  We will NOT be proceeding with endometrial ablation    Surgery risks, benefits and alternative options discussed and understood by patient/family.    Impression:     There are uterine lesions thought to represent fibroids, the largest measures 4.5 cm.     Assessment:     1. Abnormal uterine bleeding (AUB)   2. BMI 70 and over, adult         Plan: a HYSTEROSCOPY, DILATION AND CURETTAGE, PLACEMENT OF PROGESTERONE IUD AND OTHER INDICATED PROCEDURES      Oral Walter MD  Department OBN  [facility]

## 2025-07-07 NOTE — LETTER
July 9, 2025         [address] [place] [address] DR FABIOLA RODRIGUEZ [id number]       Patient: Yadira Thornton   YOB: 1980  Date of Visit: 07/07/25    To Whom It May Concern:    Yadira Thornton was at [facility] on 07/07/25. If you have any questions or concerns, or if I can be of further assistance, please do not hesitate to contact me.    Sincerely,    Chuck Elizondo LPN

## 2025-07-08 VITALS
SYSTOLIC BLOOD PRESSURE: 148 MMHG | DIASTOLIC BLOOD PRESSURE: 76 MMHG | HEIGHT: 63 IN | HEART RATE: 82 BPM | WEIGHT: 293 LBS | RESPIRATION RATE: 16 BRPM | OXYGEN SATURATION: 96 % | BODY MASS INDEX: 51.91 KG/M2 | TEMPERATURE: 98 F

## 2025-07-09 ENCOUNTER — TELEPHONE (OUTPATIENT)
Dept: OBSTETRICS AND GYNECOLOGY | Facility: CLINIC | Age: 45
End: 2025-07-09
Payer: COMMERCIAL

## 2025-07-09 NOTE — TELEPHONE ENCOUNTER
Contacted Pt regarding Return letter, notified Pt of accessing the letter in their portal, pt verbalized understanding

## 2025-07-11 ENCOUNTER — PATIENT OUTREACH (OUTPATIENT)
Dept: PHARMACY | Facility: CLINIC | Age: 45
End: 2025-07-11
Payer: COMMERCIAL

## 2025-07-17 ENCOUNTER — OFFICE VISIT (OUTPATIENT)
Dept: OBSTETRICS AND GYNECOLOGY | Facility: CLINIC | Age: 45
End: 2025-07-17
Payer: COMMERCIAL

## 2025-07-17 VITALS
DIASTOLIC BLOOD PRESSURE: 80 MMHG | HEIGHT: 63 IN | WEIGHT: 293 LBS | BODY MASS INDEX: 51.91 KG/M2 | SYSTOLIC BLOOD PRESSURE: 130 MMHG

## 2025-07-17 DIAGNOSIS — N93.9 ABNORMAL UTERINE BLEEDING (AUB): ICD-10-CM

## 2025-07-17 DIAGNOSIS — Z98.890 POSTOPERATIVE STATE: Primary | ICD-10-CM

## 2025-07-17 PROCEDURE — 99024 POSTOP FOLLOW-UP VISIT: CPT | Mod: S$GLB,,, | Performed by: OBSTETRICS & GYNECOLOGY

## 2025-07-17 PROCEDURE — 1159F MED LIST DOCD IN RCRD: CPT | Mod: CPTII,S$GLB,, | Performed by: OBSTETRICS & GYNECOLOGY

## 2025-07-17 PROCEDURE — 4010F ACE/ARB THERAPY RXD/TAKEN: CPT | Mod: CPTII,S$GLB,, | Performed by: OBSTETRICS & GYNECOLOGY

## 2025-07-17 PROCEDURE — 3075F SYST BP GE 130 - 139MM HG: CPT | Mod: CPTII,S$GLB,, | Performed by: OBSTETRICS & GYNECOLOGY

## 2025-07-17 PROCEDURE — 3079F DIAST BP 80-89 MM HG: CPT | Mod: CPTII,S$GLB,, | Performed by: OBSTETRICS & GYNECOLOGY

## 2025-07-17 PROCEDURE — 99999 PR PBB SHADOW E&M-EST. PATIENT-LVL III: CPT | Mod: PBBFAC,,, | Performed by: OBSTETRICS & GYNECOLOGY

## 2025-07-17 PROCEDURE — 3044F HG A1C LEVEL LT 7.0%: CPT | Mod: CPTII,S$GLB,, | Performed by: OBSTETRICS & GYNECOLOGY

## 2025-07-17 NOTE — PROGRESS NOTES
OBGYN POSTOPERATIVE OFFICE NOTE      Subjective:   Chief Complaint:  Post-op Evaluation       Patient ID: Yadira Thornton is a  45 y.o. female.    Date: 2025     The patient is status post: Hysteroscopy, dilation curettage and placement of progesterone IUD on 2025.  Surgery Indication(s):  Abnormal uterine bleeding    From a postoperative standpoint she is currently doing well and without complaints.    She denies any postoperative fevers.   No significant postoperative GI or urologic issues.    No significant postoperative pain.   No significant postoperative bleeding.     Pathology: DIAGNOSIS:   2025 HDM/kl     ENDOMETRIUM, CURETTAGE:     --FRAGMENTS OF WEAKLY PROLIFERATIVE ENDOMETRIUM, NEGATIVE FOR    HYPERPLASIA OR MALIGNANCY.     --FRAGMENTS OF ENDOCERVIX AND ECTOCERVIX, NEGATIVE FOR DYSPLASIA OR MALIGNANCY.        GYN & OB History  Patient's last menstrual period was 2025 (exact date).     Date of Last Pap: 2024    OB History    Para Term  AB Living   2 2 2   2   SAB IAB Ectopic Multiple Live Births             # Outcome Date GA Lbr Maikel/2nd Weight Sex Type Anes PTL Lv   2 Term            1 Term               Obstetric Comments    x 2       Past Medical History:   Diagnosis Date    Chronic pain of left knee 2021    Hypertension     Obesity     BMI > 79        Past Surgical History:   Procedure Laterality Date     SECTION      x 2    HYSTEROSCOPY WITH DILATION AND CURETTAGE OF UTERUS N/A 2025    Procedure: HYSTEROSCOPY, WITH DILATION AND CURETTAGE OF UTERUS;  Surgeon: Oral Walter MD;  Location: [facility] OR;  Service: OB/GYN;  Laterality: N/A;    INTRAUTERINE DEVICE INSERTION N/A 2025    Procedure: INSERTION, INTRAUTERINE DEVICE (IUD);  Surgeon: Oral Walter MD;  Location: [facility] OR;  Service: OB/GYN;  Laterality: N/A;  ORDER A PROGESTERONE IUD FOR PLACEMENT        Review of Systems  Review of Systems   Constitutional:  Negative for  fever.        As per HPI, otherwise no significant postoperative unexpected issues are noted   Respiratory: Negative.  Negative for shortness of breath.    Cardiovascular:  Negative for chest pain and palpitations.   Gastrointestinal:  Negative for abdominal pain, constipation, nausea and vomiting.   Genitourinary:  Negative for dysuria and hematuria.   Neurological: Negative.        Objective:      Vitals:    07/17/25 1445   BP: 130/80     Physical Exam:   Constitutional: She appears well-developed and well-nourished. No distress.    HENT:   Head: Normocephalic.     Neck: No thyroid mass present.    Cardiovascular:  Normal rate.             Pulmonary/Chest: Effort normal. No respiratory distress.        Abdominal: Soft. There is no abdominal tenderness.     Genitourinary:    Inguinal canal, vagina, uterus, right adnexa and left adnexa normal.      Pelvic exam was performed with patient supine.   The external female genitalia was normal.   No external genitalia lesions identified,Cervix is normal. Right adnexum displays no mass and no tenderness. Left adnexum displays no mass and no tenderness. No tenderness or bleeding in the vagina. Uterus is not tender. Normal urethral meatus.Urethra findings: no tendernessBladder findings: no bladder tenderness   Genitourinary Comments: A chaperone (female medical assistant) was present throughout the pelvic exam.   IUD strings visualized.          Musculoskeletal: Normal range of motion.      Right lower leg: No edema.      Left lower leg: No edema.      Lymphadenopathy: No inguinal adenopathy noted on the right or left side.    Neurological: She is alert.    Skin: Skin is warm and dry.    Psychiatric: She has a normal mood and affect. Mood normal.          Assessment:        1. Postoperative state    2. BMI 70 and over, adult    3. Abnormal uterine bleeding (AUB)          Plan:      Postoperative state    BMI 70 and over, adult    Abnormal uterine bleeding (AUB)       Follow  up for as needed / for any GYN related issues.     The above was reviewed discussed with the patient.  We discussed the findings that time of surgery as well as pathology.  We reviewed the photographic images.     From a postoperative standpoint the patient is currently doing well.  We will base further evaluation treatment on the patient's status over time.      The patient's questions regarding the above were answered and she is in agreement with the current plan.    Oral Walter MD  Department OBGYN  [facility]

## 2025-08-12 ENCOUNTER — TELEPHONE (OUTPATIENT)
Dept: PHARMACY | Facility: CLINIC | Age: 45
End: 2025-08-12
Payer: COMMERCIAL

## (undated) DEVICE — GOWN POLY REINF BRTH SLV LG

## (undated) DEVICE — GLOVE SENSICARE PI GRN 6.5

## (undated) DEVICE — SEAL LENS SCOPE MYOSURE

## (undated) DEVICE — JELLY SURGILUBE LUBE TUBE 2OZ

## (undated) DEVICE — GLOVE SENSICARE PI GRN 6

## (undated) DEVICE — GLOVE SENSICARE PI ALOE 6

## (undated) DEVICE — STRAP OR TABLE 5IN X 72IN

## (undated) DEVICE — DEVICE MYOSURE LITE TISS REM

## (undated) DEVICE — GOWN POLY REINF BRTH SLV 2XL

## (undated) DEVICE — TOWEL OR DISP STRL BLUE 4/PK

## (undated) DEVICE — BOWL STERILE LARGE 32OZ

## (undated) DEVICE — UNDERPAD ULTRASORB 300LB 30X36

## (undated) DEVICE — SOL NORMAL USPCA 0.9%

## (undated) DEVICE — SOL NACL IRR 1000ML BTL

## (undated) DEVICE — PACK SET UP 190 OMC-NS

## (undated) DEVICE — TRAY SKIN SCRUB DRY PREMIUM

## (undated) DEVICE — GLOVE SENSICARE PI ALOE 7.5

## (undated) DEVICE — GLOVE SENSICARE PI SURG 6

## (undated) DEVICE — DRAPE UINDERBUT GRAD PCH

## (undated) DEVICE — CATH URETHRAL RED 16FR

## (undated) DEVICE — PAD SANITARY MAXI 11IN

## (undated) DEVICE — SCRUB DYNA-HEX LIQ 4% CHG 4OZ

## (undated) DEVICE — PACK FLUENT DISPOSABLE

## (undated) DEVICE — PACK SURG LITHOTOMY 3 SIRUS